# Patient Record
Sex: MALE | Race: WHITE | NOT HISPANIC OR LATINO | Employment: FULL TIME | URBAN - METROPOLITAN AREA
[De-identification: names, ages, dates, MRNs, and addresses within clinical notes are randomized per-mention and may not be internally consistent; named-entity substitution may affect disease eponyms.]

---

## 2023-12-12 DIAGNOSIS — Z12.11 COLON CANCER SCREENING: ICD-10-CM

## 2023-12-12 RX ORDER — POLYETHYLENE GLYCOL 3350, SODIUM SULFATE ANHYDROUS, SODIUM BICARBONATE, SODIUM CHLORIDE, POTASSIUM CHLORIDE 236; 22.74; 6.74; 5.86; 2.97 G/4L; G/4L; G/4L; G/4L; G/4L
4000 POWDER, FOR SOLUTION ORAL ONCE
Qty: 4000 ML | Refills: 0 | Status: SHIPPED | OUTPATIENT
Start: 2023-12-12 | End: 2023-12-12

## 2023-12-13 ENCOUNTER — OFFICE VISIT (OUTPATIENT)
Dept: ORTHOPEDIC SURGERY | Facility: HOSPITAL | Age: 58
End: 2023-12-13
Payer: COMMERCIAL

## 2023-12-13 ENCOUNTER — HOSPITAL ENCOUNTER (OUTPATIENT)
Dept: RADIOLOGY | Facility: HOSPITAL | Age: 58
Discharge: HOME | End: 2023-12-13
Payer: COMMERCIAL

## 2023-12-13 VITALS — BODY MASS INDEX: 27.16 KG/M2 | WEIGHT: 230 LBS | HEIGHT: 77 IN

## 2023-12-13 DIAGNOSIS — M25.819 SHOULDER IMPINGEMENT: ICD-10-CM

## 2023-12-13 PROCEDURE — 73030 X-RAY EXAM OF SHOULDER: CPT | Mod: RIGHT SIDE | Performed by: RADIOLOGY

## 2023-12-13 PROCEDURE — 2500000005 HC RX 250 GENERAL PHARMACY W/O HCPCS: Performed by: PHYSICIAN ASSISTANT

## 2023-12-13 PROCEDURE — 73030 X-RAY EXAM OF SHOULDER: CPT | Mod: RT

## 2023-12-13 PROCEDURE — 99213 OFFICE O/P EST LOW 20 MIN: CPT | Performed by: PHYSICIAN ASSISTANT

## 2023-12-13 PROCEDURE — 1036F TOBACCO NON-USER: CPT | Performed by: PHYSICIAN ASSISTANT

## 2023-12-13 PROCEDURE — 2500000004 HC RX 250 GENERAL PHARMACY W/ HCPCS (ALT 636 FOR OP/ED): Performed by: PHYSICIAN ASSISTANT

## 2023-12-13 ASSESSMENT — PAIN DESCRIPTION - DESCRIPTORS: DESCRIPTORS: THROBBING;ACHING

## 2023-12-13 ASSESSMENT — PAIN - FUNCTIONAL ASSESSMENT: PAIN_FUNCTIONAL_ASSESSMENT: 0-10

## 2023-12-13 ASSESSMENT — PAIN SCALES - GENERAL: PAINLEVEL_OUTOF10: 5 - MODERATE PAIN

## 2023-12-14 PROCEDURE — 20610 DRAIN/INJ JOINT/BURSA W/O US: CPT | Performed by: PHYSICIAN ASSISTANT

## 2023-12-14 PROCEDURE — 2500000004 HC RX 250 GENERAL PHARMACY W/ HCPCS (ALT 636 FOR OP/ED): Performed by: PHYSICIAN ASSISTANT

## 2023-12-14 PROCEDURE — 2500000005 HC RX 250 GENERAL PHARMACY W/O HCPCS: Performed by: PHYSICIAN ASSISTANT

## 2023-12-14 RX ORDER — TRIAMCINOLONE ACETONIDE 40 MG/ML
40 INJECTION, SUSPENSION INTRA-ARTICULAR; INTRAMUSCULAR
Status: COMPLETED | OUTPATIENT
Start: 2023-12-14 | End: 2023-12-14

## 2023-12-14 RX ORDER — LIDOCAINE HYDROCHLORIDE 10 MG/ML
4 INJECTION INFILTRATION; PERINEURAL
Status: COMPLETED | OUTPATIENT
Start: 2023-12-14 | End: 2023-12-14

## 2023-12-14 RX ADMIN — TRIAMCINOLONE ACETONIDE 40 MG: 400 INJECTION, SUSPENSION INTRA-ARTICULAR; INTRAMUSCULAR at 11:08

## 2023-12-14 RX ADMIN — LIDOCAINE HYDROCHLORIDE 4 ML: 10 INJECTION, SOLUTION INFILTRATION; PERINEURAL at 11:08

## 2023-12-14 NOTE — PROGRESS NOTES
Steven presents to clinic today with complaints of right shoulder pain.  He has been having this issues for approximately a year and a half.  He was seen by Dr. Newton at that time recommendation was for physical therapy and anti-inflammatory medication.  He did get some relief with therapy however recently started noticing worsening recurrent symptoms after a fall where he landed onto his right outstretched hand.  He has difficulty with certain activities such as reaching and laying on the affected side.  Denies any numbness or tingling.  Has been continuing with some home therapy exercises without any significant relief.    Patient's self reported past medical history, medications, allergies, surgical history, family and social history as well as a 10 point review of systems has been documented in the new patient intake form and scanned into the patient's electronic medical record. Pertinent findings are documented in the HPI.    Physical Examination Findings:  Constitutional: Appears well-developed and well-nourished.  Head: Normocephalic and atraumatic.  Eyes: Pupils are equal and round.  Cardiovascular: Intact distal pulses.   Respiratory: Effort normal. No respiratory distress.  Neurologic: Alert and oriented to person, place, and time.  Skin: Skin is warm and dry.  Hematologic / Lymphatic: No lymphedema, lymphangitis.  Psychiatric: normal mood and affect. Behavior is normal.   Musculoskeletal: Right upper extremity with full range of motion forward arm elevation 180 degrees, abduction 180 degrees.  External rotation 80 degrees.  Motor power abduction 5/5, external rotation 5/5, internal rotation 5/5.  Pain with resisted shoulder abduction.    Reviewed x-rays taken today of the right shoulder reveals well-preserved joint spaces no acute findings.    Impression: Right shoulder impingement    Plan: We discussed his symptoms in detail today.  His pain is consistent with shoulder impingement.  I discussed this  diagnosis in detail today we also discussed differential diagnosis of rotator cuff involvement.  At this time given he has failed conservative treatment we have discussed a subacromial steroid injection.  This was provided today and tolerated well.  If he continues to have symptoms or does not get relief following injection we have discussed further evaluation with MRI of the right shoulder.  He will return to office as needed.    Patient ID: Steven Brice is a 58 y.o. male.    L Inj/Asp: R subacromial bursa on 12/14/2023 11:08 AM  Details: 22 G needle, posterior approach  Medications: 40 mg triamcinolone acetonide 40 mg/mL; 4 mL lidocaine 10 mg/mL (1 %)  Procedure, treatment alternatives, risks and benefits explained, specific risks discussed. Immediately prior to procedure a time out was called to verify the correct patient, procedure, equipment, support staff and site/side marked as required.         Herminia Leblanc PA-C  Department of Orthopaedic Surgery  [facility]    Dictation performed with the use of voice recognition software. Syntax and grammatical errors may exist.

## 2024-01-24 ENCOUNTER — OFFICE VISIT (OUTPATIENT)
Dept: GASTROENTEROLOGY | Facility: EXTERNAL LOCATION | Age: 59
End: 2024-01-24
Payer: COMMERCIAL

## 2024-01-24 DIAGNOSIS — D12.2 BENIGN NEOPLASM OF ASCENDING COLON: Primary | ICD-10-CM

## 2024-01-24 DIAGNOSIS — K57.30 DIVERTICULOSIS OF LARGE INTESTINE WITHOUT DIVERTICULITIS: ICD-10-CM

## 2024-01-24 DIAGNOSIS — Z12.11 ENCOUNTER FOR SCREENING FOR MALIGNANT NEOPLASM OF COLON: ICD-10-CM

## 2024-01-24 DIAGNOSIS — K64.8 INTERNAL HEMORRHOIDS: ICD-10-CM

## 2024-01-24 DIAGNOSIS — D12.3 BENIGN NEOPLASM OF TRANSVERSE COLON: ICD-10-CM

## 2024-01-24 PROCEDURE — 45380 COLONOSCOPY AND BIOPSY: CPT | Performed by: INTERNAL MEDICINE

## 2024-01-24 PROCEDURE — 88305 TISSUE EXAM BY PATHOLOGIST: CPT

## 2024-01-24 PROCEDURE — 1036F TOBACCO NON-USER: CPT | Performed by: INTERNAL MEDICINE

## 2024-01-24 PROCEDURE — 88305 TISSUE EXAM BY PATHOLOGIST: CPT | Performed by: PATHOLOGY

## 2024-01-24 PROCEDURE — 45385 COLONOSCOPY W/LESION REMOVAL: CPT | Performed by: INTERNAL MEDICINE

## 2024-01-24 PROCEDURE — 0753T DGTZ GLS MCRSCP SLD LEVEL IV: CPT

## 2024-01-24 NOTE — PROGRESS NOTES
Endoscopic procedure was done in [place] and the procedure report can be found under the media tab.

## 2024-01-25 ENCOUNTER — LAB REQUISITION (OUTPATIENT)
Dept: LAB | Facility: HOSPITAL | Age: 59
End: 2024-01-25
Payer: COMMERCIAL

## 2024-01-30 LAB
LABORATORY COMMENT REPORT: NORMAL
PATH REPORT.FINAL DX SPEC: NORMAL
PATH REPORT.GROSS SPEC: NORMAL
PATH REPORT.MICROSCOPIC SPEC OTHER STN: NORMAL
PATH REPORT.RELEVANT HX SPEC: NORMAL
PATH REPORT.TOTAL CANCER: NORMAL

## 2024-04-17 ENCOUNTER — OFFICE VISIT (OUTPATIENT)
Dept: ORTHOPEDIC SURGERY | Facility: CLINIC | Age: 59
End: 2024-04-17
Payer: COMMERCIAL

## 2024-04-17 VITALS — WEIGHT: 230 LBS | HEIGHT: 77 IN | BODY MASS INDEX: 27.16 KG/M2

## 2024-04-17 DIAGNOSIS — M75.81 ROTATOR CUFF TENDINITIS, RIGHT: ICD-10-CM

## 2024-04-17 DIAGNOSIS — M75.21 BICEPS TENDINITIS ON RIGHT: ICD-10-CM

## 2024-04-17 DIAGNOSIS — S46.911A RIGHT SHOULDER STRAIN, INITIAL ENCOUNTER: Primary | ICD-10-CM

## 2024-04-17 PROCEDURE — 99214 OFFICE O/P EST MOD 30 MIN: CPT

## 2024-04-17 PROCEDURE — 1036F TOBACCO NON-USER: CPT

## 2024-04-17 RX ORDER — METHYLPREDNISOLONE 4 MG/1
4 TABLET ORAL ONCE
Qty: 1 TABLET | Refills: 0 | Status: SHIPPED | OUTPATIENT
Start: 2024-04-17 | End: 2024-04-17

## 2024-04-17 ASSESSMENT — PAIN SCALES - GENERAL: PAINLEVEL_OUTOF10: 5 - MODERATE PAIN

## 2024-04-17 ASSESSMENT — PAIN DESCRIPTION - DESCRIPTORS: DESCRIPTORS: ACHING

## 2024-04-17 ASSESSMENT — PAIN - FUNCTIONAL ASSESSMENT: PAIN_FUNCTIONAL_ASSESSMENT: 0-10

## 2024-04-17 NOTE — PROGRESS NOTES
Steven Brice is a 59 y.o. year-old  male  he is a new patient to our office and presents with a chief complaint of Right shoulder pain. This has been an ongoing issues for him over the past 2 years, but most recently 2 months ago he was playing basketball and his shoulder was jammed by another player. The pain has increased since then. He has been seen by other ortho providers for this shoulder who had been treating him with conservative measures such as NSAIDs, physical therapy and steroid injection (given on 12/13/23). The pain has continued despite these treatment modalities. Pains location is in the front of the shoulder. Admits to the injury, pain with raising arm over head, pain with laying on shoulder, night pain, weakness of the RUE. Denies neck pain, numbness/tingling, prior surgery to the shoulder. Treatment to date includes activity modification/NSAIDs/home exercises/physical therapy without adequate relief.       Past Medical, Family, and Social History reviewed     Review of Systems  A complete review of systems was conducted, pertinent only to the HPI noted above.  Constitutional: None  Eyes: No additions to above history  Ears, Nose, Throat: No additions to above history  Cardiovascular: No additions to above history  Respiratory: No additions to above history  GI: No additions to above history  : No additions to above history  Skin/Neuro: No additions to above history  Endocrine/Heme/Lymph: No additions to above history  Immunologic: No additions to above history  Psychiatric: No additions to above history  Musculoskeletal: see above  Physical Exam  GEN: Alert and Oriented x 3  Constitutional: Well appearing, in no apparent distress.  Eyes: sclera anicteric  ENT: hearing appropriate for normal conversation, neck appears symmetric with no gross thyromegaly  Pulm: No labored breathing, no wheezing  CVS: Regular rate and rhythm  PSY: normal mood and affect  Skin: No rashes, erythema, or induration  around shoulder     Focused Musculoskeletal Exam:     Side: Right shoulder:  PROM:   FE (170)   ER 60 ABER/ABIR: 90/90  AROM:   FE (170)   ER 45 IR T8  Strength:  Supra [4/5] Infra [5/5] Subscap [5/5]  Abd [5/5]    Special Tests  Shoulder  Impingement Tests:  Neer + Heredia +  Speed's test +      ACJ:  AC TTP: [neg]  Cross Arm [neg]  AC prominence [no]      [Sensation intact Ax/median/ulnar/radial distributions  Motor intact Ax/median/radial/ulnar/AIN/PIN    X-rays of the shoulder independently viewed and interpreted: humeral head well aligned within glenoid. joint spaced well preserved. No fracture or dislocation.     1. Right shoulder strain, initial encounter  MR shoulder right wo IV contrast    methylPREDNISolone (Medrol Dospak) 4 mg tablets      2. Rotator cuff tendinitis, right        3. Biceps tendinitis on right             The patient history, physical examination and imaging studies are consistent with the diagnosis above.    Given the patient's failure of multiple conservative treatments, symptoms, and clinical exam findings, recommend an MRI of the right shoulder to evaluate for the rotator cuff and biceps tendon. He elected to proceed. He has been taking NSAIDs chronically, recommend he discontinue and offered a tapered medrol dose pack in the meantime which was prescribed. We will see the patient back after the MRI to review the results and further recommendations will be made at that time. All questions answered, patient in agreement with plan.         Active Ambulatory Problems     Diagnosis Date Noted    No Active Ambulatory Problems     Resolved Ambulatory Problems     Diagnosis Date Noted    No Resolved Ambulatory Problems     Past Medical History:   Diagnosis Date    Fracture of nasal bones, initial encounter for closed fracture     Personal history of (healed) traumatic fracture     Personal history of other diseases of the digestive system     Unspecified injury of unspecified ankle, initial  encounter         Social History     Socioeconomic History    Marital status:      Spouse name: None    Number of children: None    Years of education: None    Highest education level: None   Occupational History    None   Tobacco Use    Smoking status: Never    Smokeless tobacco: Never   Vaping Use    Vaping status: Never Used   Substance and Sexual Activity    Alcohol use: Yes    Drug use: Never    Sexual activity: None   Other Topics Concern    None   Social History Narrative    None     Social Determinants of Health     Financial Resource Strain: Not on file   Food Insecurity: Not on file   Transportation Needs: Not on file   Physical Activity: Not on file   Stress: Not on file   Social Connections: Not on file   Intimate Partner Violence: Not on file   Housing Stability: Not on file        No family history on file.     Past Surgical History:   Procedure Laterality Date    HEMORRHOID SURGERY  06/27/2014    Hemorrhoidectomy        No Known Allergies

## 2024-04-30 ENCOUNTER — APPOINTMENT (OUTPATIENT)
Dept: RADIOLOGY | Facility: HOSPITAL | Age: 59
End: 2024-04-30
Payer: COMMERCIAL

## 2024-04-30 ENCOUNTER — HOSPITAL ENCOUNTER (OUTPATIENT)
Dept: RADIOLOGY | Facility: HOSPITAL | Age: 59
Discharge: HOME | End: 2024-04-30
Payer: COMMERCIAL

## 2024-04-30 DIAGNOSIS — S46.911A RIGHT SHOULDER STRAIN, INITIAL ENCOUNTER: ICD-10-CM

## 2024-04-30 PROCEDURE — 73221 MRI JOINT UPR EXTREM W/O DYE: CPT | Mod: RT

## 2024-04-30 PROCEDURE — 73221 MRI JOINT UPR EXTREM W/O DYE: CPT | Mod: RIGHT SIDE | Performed by: RADIOLOGY

## 2024-05-07 ENCOUNTER — OFFICE VISIT (OUTPATIENT)
Dept: ORTHOPEDIC SURGERY | Facility: CLINIC | Age: 59
End: 2024-05-07
Payer: COMMERCIAL

## 2024-05-07 DIAGNOSIS — S46.011D TRAUMATIC COMPLETE TEAR OF RIGHT ROTATOR CUFF, SUBSEQUENT ENCOUNTER: Primary | ICD-10-CM

## 2024-05-07 PROCEDURE — 99214 OFFICE O/P EST MOD 30 MIN: CPT | Performed by: STUDENT IN AN ORGANIZED HEALTH CARE EDUCATION/TRAINING PROGRAM

## 2024-05-07 PROCEDURE — 20610 DRAIN/INJ JOINT/BURSA W/O US: CPT | Performed by: STUDENT IN AN ORGANIZED HEALTH CARE EDUCATION/TRAINING PROGRAM

## 2024-05-07 PROCEDURE — 2500000005 HC RX 250 GENERAL PHARMACY W/O HCPCS: Performed by: STUDENT IN AN ORGANIZED HEALTH CARE EDUCATION/TRAINING PROGRAM

## 2024-05-07 PROCEDURE — 2500000004 HC RX 250 GENERAL PHARMACY W/ HCPCS (ALT 636 FOR OP/ED): Performed by: STUDENT IN AN ORGANIZED HEALTH CARE EDUCATION/TRAINING PROGRAM

## 2024-05-07 RX ORDER — TRIAMCINOLONE ACETONIDE 40 MG/ML
40 INJECTION, SUSPENSION INTRA-ARTICULAR; INTRAMUSCULAR
Status: COMPLETED | OUTPATIENT
Start: 2024-05-07 | End: 2024-05-07

## 2024-05-07 RX ORDER — LIDOCAINE HYDROCHLORIDE 10 MG/ML
4 INJECTION INFILTRATION; PERINEURAL
Status: COMPLETED | OUTPATIENT
Start: 2024-05-07 | End: 2024-05-07

## 2024-05-07 RX ADMIN — LIDOCAINE HYDROCHLORIDE 4 ML: 10 INJECTION, SOLUTION INFILTRATION; PERINEURAL at 14:13

## 2024-05-07 RX ADMIN — TRIAMCINOLONE ACETONIDE 40 MG: 40 INJECTION, SUSPENSION INTRA-ARTICULAR; INTRAMUSCULAR at 14:13

## 2024-05-07 NOTE — PROGRESS NOTES
Steven Brice is a 59 y.o. year-old  male  he is a new patient to our office and presents with a chief complaint of Right shoulder pain.  He notes this pain is been ongoing for a year he has had 1 corticosteroid injection without significant relief      Past Medical, Family, and Social History reviewed     Review of Systems  A complete review of systems was conducted, pertinent only to the HPI noted above.    Physical Exam  GEN: Alert and Oriented x 3  Constitutional: Well appearing, in no apparent distress.  Eyes: sclera anicteric  ENT: hearing appropriate for normal conversation, neck appears symmetric with no gross thyromegaly  Pulm: No labored breathing, no wheezing  CVS: Regular rate and rhythm  PSY: normal mood and affect  Skin: No rashes, erythema, or induration around shoulder     Focused Musculoskeletal Exam:     Side: Right shoulder:  PROM:   FE (170)   ER 60 ABER/ABIR: 90/90  AROM:   FE (170)   ER 45 IR T8  Strength:  Supra [4/5] Infra [5/5] Subscap [5/5]  Abd [5/5]    Special Tests  Shoulder  Pain with Jobes and empty can positive tenderness over the biceps      ACJ:  AC TTP: [neg]  Cross Arm [neg]  AC prominence [no]      [Sensation intact Ax/median/ulnar/radial distributions  Motor intact Ax/median/radial/ulnar/AIN/PIN    X-rays and MRI of the shoulder independently viewed and interpreted: Full-thickness anterior supraspinatus tendon rupture with minimal retraction no significant atrophy upper border subscap tear with biceps subluxation    L Inj/Asp: R glenohumeral on 5/7/2024 2:13 PM  Indications: pain  Details: 21 G needle, posterior approach  Medications: 40 mg triamcinolone acetonide 40 mg/mL; 4 mL lidocaine 10 mg/mL (1 %)  Outcome: tolerated well, no immediate complications  Procedure, treatment alternatives, risks and benefits explained, specific risks discussed. Consent was given by the patient. Immediately prior to procedure a time out was called to verify the correct patient, procedure,  equipment, support staff and site/side marked as required. Patient was prepped and draped in the usual sterile fashion.         The patient history, physical examination and imaging studies are consistent with the diagnosis above.    I did review the MRI with the patient which does demonstrate a rotator cuff tendon tear biceps subluxation due to upper border subscap tear.  He is exhausted nonsurgical treatment and I discussed and recommended surgery in the form of arthroscopy rotator cuff repair and biceps tenodesis.  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  He is interested in another injection.  I did review with him the risk of repeated injections prior to rotator cuff repair with regards to tendon healing.  Nevertheless he elected to proceed.  He is going to think about when he may want to proceed with surgery I did briefly review the risk of surgery including but not limited bleed infection injury to any tissues nerves vessels DVT/PE frozen shoulder tendon  nonhealing and the potential need for future surgery.  Should he elect to proceed with rotator cuff repair he can schedule through my office.

## 2024-05-21 ENCOUNTER — OFFICE VISIT (OUTPATIENT)
Dept: UROLOGY | Facility: HOSPITAL | Age: 59
End: 2024-05-21
Payer: COMMERCIAL

## 2024-05-21 DIAGNOSIS — N13.8 BPH WITH OBSTRUCTION/LOWER URINARY TRACT SYMPTOMS: Primary | ICD-10-CM

## 2024-05-21 DIAGNOSIS — N40.1 BPH WITH OBSTRUCTION/LOWER URINARY TRACT SYMPTOMS: Primary | ICD-10-CM

## 2024-05-21 PROCEDURE — 99214 OFFICE O/P EST MOD 30 MIN: CPT | Performed by: STUDENT IN AN ORGANIZED HEALTH CARE EDUCATION/TRAINING PROGRAM

## 2024-05-21 PROCEDURE — 99204 OFFICE O/P NEW MOD 45 MIN: CPT | Performed by: STUDENT IN AN ORGANIZED HEALTH CARE EDUCATION/TRAINING PROGRAM

## 2024-05-21 RX ORDER — TAMSULOSIN HYDROCHLORIDE 0.4 MG/1
0.4 CAPSULE ORAL DAILY
Qty: 30 CAPSULE | Refills: 11 | Status: SHIPPED | OUTPATIENT
Start: 2024-05-21 | End: 2025-05-21

## 2024-05-21 NOTE — PROGRESS NOTES
Subjective   Patient ID: Steven Brice is a 59 y.o. male    HPI  59 y.o. male who reports experiencing urinary symptoms over the past few months. He describes issues with frequency, urgency, weak stream, and incomplete emptying of the bladder. He wakes up three to four times at night to urinate. The patient has not been on any medication for these symptoms. He has also mentioned problems with swallowing and typically drinks sips of water during dinner. The patient is an international  and has recently returned from a trip out of the country.       Review of Systems    All systems were reviewed. Anything negative was noted in the HPI.    Objective   Physical Exam    General: Well developed, well nourished, alert and cooperative, appears in no acute distress   Eyes: Non-injected conjunctiva, sclera clear, no proptosis   Cardiac: Extremities are warm and well perfused. No edema, cyanosis or pallor   Lungs: Breathing is easy, non-labored. Speaking in clear and complete sentences. Normal diaphragmatic movement   MSK: Ambulatory with steady gait, unassisted   Neuro: Alert and oriented to person, place, and time   Psych: Demonstrates good judgment and reason, without hallucinations, abnormal affect or abnormal behaviors   Skin: No obvious lesions, no rashes       No CVA tenderness bilaterally   No suprapubic pain or discomfort       Past Medical History:   Diagnosis Date   • Fracture of nasal bones, initial encounter for closed fracture     Fractured nose   • Personal history of (healed) traumatic fracture     History of fracture of finger   • Personal history of other diseases of the digestive system     History of hemorrhoids   • Unspecified injury of unspecified ankle, initial encounter     Ankle injury         Past Surgical History:   Procedure Laterality Date   • HEMORRHOID SURGERY  06/27/2014    Hemorrhoidectomy           Assessment/Plan   Benign Prostatic Hyperplasia (BPH)    59 y.o. male who presents for  the above condition, Today, we had a very long and extensive discussion with the patient regarding the pathophysiology, differential diagnosis, risk factor, associated condition, diagnostic work-up and management of BPH and lower urinary tract symptoms and acute urinary retention. I discussed with the patient the need to check his PSA to assess his prostate cancer risk. We discussed at length the mechanism of action, risk, benefit, adverse events and side effect of alpha-blocker in the form of tamsulosin 0.4 mg p.o nightly. We discussed in particular the risk of hypotension, lightheadedness, dizziness, and the risk of fall and bone fracture. Also discussed retrograde ejaculation of the side effects of the medication. We had another discussion with the patient regarding lifestyle modifications including low fluid intake after 5 PM, timed voiding every 2 hours, and decrease caffeine intake. I discussed with the patient that in case he had an elevated PSA, we will proceed do an MRI of the prostate.      Plan:  - Renal US  - Flomax 0.4 mg/day  - Follow up in 1 month with cystoscopy        5/21/2024    Shirin Attestation  By signing my name below, I, Shirin Hunt   attest that this documentation has been prepared under the direction and in the presence of Dr. Omar Vaughan

## 2024-06-11 ENCOUNTER — HOSPITAL ENCOUNTER (OUTPATIENT)
Dept: RADIOLOGY | Facility: HOSPITAL | Age: 59
Discharge: HOME | End: 2024-06-11
Payer: COMMERCIAL

## 2024-06-11 ENCOUNTER — APPOINTMENT (OUTPATIENT)
Dept: RADIOLOGY | Facility: CLINIC | Age: 59
End: 2024-06-11
Payer: COMMERCIAL

## 2024-06-11 ENCOUNTER — APPOINTMENT (OUTPATIENT)
Dept: RADIOLOGY | Facility: HOSPITAL | Age: 59
End: 2024-06-11
Payer: COMMERCIAL

## 2024-06-11 ENCOUNTER — PROCEDURE VISIT (OUTPATIENT)
Dept: UROLOGY | Facility: HOSPITAL | Age: 59
End: 2024-06-11
Payer: COMMERCIAL

## 2024-06-11 DIAGNOSIS — N40.1 BPH WITH OBSTRUCTION/LOWER URINARY TRACT SYMPTOMS: ICD-10-CM

## 2024-06-11 DIAGNOSIS — N40.0 BENIGN PROSTATIC HYPERPLASIA, UNSPECIFIED WHETHER LOWER URINARY TRACT SYMPTOMS PRESENT: ICD-10-CM

## 2024-06-11 DIAGNOSIS — N13.8 BPH WITH OBSTRUCTION/LOWER URINARY TRACT SYMPTOMS: ICD-10-CM

## 2024-06-11 DIAGNOSIS — Z79.2 PROPHYLACTIC ANTIBIOTIC: Primary | ICD-10-CM

## 2024-06-11 LAB
POC APPEARANCE, URINE: CLEAR
POC BILIRUBIN, URINE: NEGATIVE
POC BLOOD, URINE: NEGATIVE
POC COLOR, URINE: YELLOW
POC GLUCOSE, URINE: NEGATIVE MG/DL
POC KETONES, URINE: NEGATIVE MG/DL
POC LEUKOCYTES, URINE: NEGATIVE
POC NITRITE,URINE: NEGATIVE
POC PH, URINE: 6.5 PH
POC PROTEIN, URINE: NEGATIVE MG/DL
POC SPECIFIC GRAVITY, URINE: 1.02
POC UROBILINOGEN, URINE: 0.2 EU/DL

## 2024-06-11 PROCEDURE — 81003 URINALYSIS AUTO W/O SCOPE: CPT | Mod: QW | Performed by: STUDENT IN AN ORGANIZED HEALTH CARE EDUCATION/TRAINING PROGRAM

## 2024-06-11 PROCEDURE — 76770 US EXAM ABDO BACK WALL COMP: CPT | Performed by: RADIOLOGY

## 2024-06-11 PROCEDURE — 76770 US EXAM ABDO BACK WALL COMP: CPT

## 2024-06-11 PROCEDURE — 52000 CYSTOURETHROSCOPY: CPT | Performed by: STUDENT IN AN ORGANIZED HEALTH CARE EDUCATION/TRAINING PROGRAM

## 2024-06-11 PROCEDURE — 99213 OFFICE O/P EST LOW 20 MIN: CPT | Performed by: STUDENT IN AN ORGANIZED HEALTH CARE EDUCATION/TRAINING PROGRAM

## 2024-06-11 RX ORDER — CIPROFLOXACIN 500 MG/1
500 TABLET ORAL ONCE
Status: SHIPPED | OUTPATIENT
Start: 2024-06-11

## 2024-06-11 NOTE — PROGRESS NOTES
Patient ID: Steven Brice is a 59 y.o. male.    Cystoscopy    Date/Time: 6/11/2024 3:53 PM    Performed by: Omar Vaughan MD MPH  Authorized by: Omar Vaughan MD MPH    Procedure - Bladder Cystoscopy:     Procedure details: cystoscopy    PROCEDURE NOTE:    PREOPERATIVE DIAGNOSIS:  BPH    POSTOPERATIVE DIAGNOSIS:  Same    OPERATION:  Flexible Cystourethroscopy      SURGEON:  Omar Vaughan MD MPH    ANESTHESIA:  2%  lidocaine jelly    COMPLICATIONS:  None    EBL: Minimal        DISPOSITION:  The patient was discharged home after the procedure, per routine.    INDICATIONS: :  Mr. Brice is a 59 y.o. patient with a history of BPH who presents today for Cystoscopy.     The indications, risks and benefits of this procedure were discussed with the patient, consent was obtained prior to the procedure, and to the best of my judgement the patient seemed to understand and agree to the procedure.    PROCEDURE:  The patient  was brought into the procedure suite and informed consent was reviewed and confirmed. Vital signs were obtained prior to the procedure: There were no vitals taken for this visit..  The patient was escorted onto the stretcher, placed supine, prepped with betadine and draped in the usual standard surgical fashion.  Intraurethral 2% viscous lidocaine jelly was used for local analgesia.  A 16 Polish flexible cystourethroscope was inserted into the urethra.   The penile urethra was normal.  The prostate urethra was enlarged.  Upon entering the bladder the entire bladder was surveyed in a 360 degree fashion.  The left and right ureteral orifices were in normal orthotopic position effluxing clear yellow urine, bilaterally.   There was no evidence of any bladder lesions, foreign objects, stones or evidence of any mucosal changes. The cystoscope was then retroflexed.  The bladder neck was then further examined without any evidence of lesions. The scope was then removed and in an antegrade fashion, the  urethra and bladder were again resurveyed with no evidence of additional lesions.  The cystoscope was then fully removed.   The patient tolerated the procedure well.  Vitals were stable after the procedure.  The patient was able to void and was discharged home.  Verbal and written Post procedure instructions were reviewed with the patient.    The scope malfunctioned mid procedure and we had to change it and use a new flexible cystoscope. No complications or side effect resulted from this event.      IMPRESSION:  Moderate BPH    PLAN:  Fu in 1 month

## 2024-06-11 NOTE — PROGRESS NOTES
Subjective   Patient ID: Steven Brice is a 59 y.o. male    HPI  59 y.o. male who for cystoscopic evaluation of prostate, he was experiencing urinary symptoms over the past few months. He describes issues with frequency, urgency, weak stream, and incomplete emptying of the bladder. He wakes up three to four times at night to urinate. The patient has not been on any medication for these symptoms. He has also mentioned problems with swallowing and typically drinks sips of water during dinner. The patient is an international  and has recently returned from a trip out of the country.        Review of Systems    All systems were reviewed. Anything negative was noted in the HPI.    Objective   Physical Exam    General: Well developed, well nourished, alert and cooperative, appears in no acute distress   Eyes: Non-injected conjunctiva, sclera clear, no proptosis   Cardiac: Extremities are warm and well perfused. No edema, cyanosis or pallor   Lungs: Breathing is easy, non-labored. Speaking in clear and complete sentences. Normal diaphragmatic movement   MSK: Ambulatory with steady gait, unassisted   Neuro: Alert and oriented to person, place, and time   Psych: Demonstrates good judgment and reason, without hallucinations, abnormal affect or abnormal behaviors   Skin: No obvious lesions, no rashes       No CVA tenderness bilaterally   No suprapubic pain or discomfort       Past Medical History:   Diagnosis Date    Fracture of nasal bones, initial encounter for closed fracture     Fractured nose    Personal history of (healed) traumatic fracture     History of fracture of finger    Personal history of other diseases of the digestive system     History of hemorrhoids    Unspecified injury of unspecified ankle, initial encounter     Ankle injury         Past Surgical History:   Procedure Laterality Date    HEMORRHOID SURGERY  06/27/2014    Hemorrhoidectomy           Assessment/Plan   Cystoscopic evaluation of Benign  Prostatic Hyperplasia (BPH)    59 y.o. male who presents for the above condition, Today, we had a very long and extensive discussion with the patient regarding the pathophysiology, differential diagnosis, risk factor, associated condition, diagnostic work-up and management of BPH and lower urinary tract symptoms and acute urinary retention. I discussed with the patient the need to check his PSA to assess his prostate cancer risk. We discussed at length the mechanism of action, risk, benefit, adverse events and side effect of alpha-blocker in the form of tamsulosin 0.4 mg p.o nightly. We discussed in particular the risk of hypotension, lightheadedness, dizziness, and the risk of fall and bone fracture. Also discussed retrograde ejaculation of the side effects of the medication. We had another discussion with the patient regarding lifestyle modifications including low fluid intake after 5 PM, timed voiding every 2 hours, and decrease caffeine intake. I discussed with the patient that in case he had an elevated PSA, we will proceed do an MRI of the prostate.      Plan:  - Fu in 1 month to discuss Urolift   - PSA        6/11/2024    Shirin Attestation  By signing my name below, IChad Scribe   attest that this documentation has been prepared under the direction and in the presence of Dr. Omar Vaughan

## 2024-06-13 ENCOUNTER — TELEPHONE (OUTPATIENT)
Dept: UROLOGY | Facility: HOSPITAL | Age: 59
End: 2024-06-13
Payer: COMMERCIAL

## 2024-06-13 DIAGNOSIS — N40.1 BPH WITH OBSTRUCTION/LOWER URINARY TRACT SYMPTOMS: Primary | ICD-10-CM

## 2024-06-13 DIAGNOSIS — N13.8 BPH WITH OBSTRUCTION/LOWER URINARY TRACT SYMPTOMS: Primary | ICD-10-CM

## 2024-06-13 NOTE — TELEPHONE ENCOUNTER
----- Message from Omar Vaughan MD MPH sent at 6/13/2024  8:33 AM EDT -----  He needs plz a CT a/p with no contrast   ----- Message -----  From: Erendira, Radiology Results In  Sent: 6/13/2024  12:23 AM EDT  To: Omar Vaughan MD MPH

## 2024-06-25 ENCOUNTER — APPOINTMENT (OUTPATIENT)
Dept: UROLOGY | Facility: HOSPITAL | Age: 59
End: 2024-06-25
Payer: COMMERCIAL

## 2024-06-28 ENCOUNTER — HOSPITAL ENCOUNTER (OUTPATIENT)
Dept: RADIOLOGY | Facility: CLINIC | Age: 59
Discharge: HOME | End: 2024-06-28
Payer: COMMERCIAL

## 2024-06-28 DIAGNOSIS — N40.1 BPH WITH OBSTRUCTION/LOWER URINARY TRACT SYMPTOMS: ICD-10-CM

## 2024-06-28 DIAGNOSIS — N13.8 BPH WITH OBSTRUCTION/LOWER URINARY TRACT SYMPTOMS: ICD-10-CM

## 2024-06-28 PROCEDURE — 74176 CT ABD & PELVIS W/O CONTRAST: CPT

## 2024-07-11 ENCOUNTER — APPOINTMENT (OUTPATIENT)
Dept: UROLOGY | Facility: HOSPITAL | Age: 59
End: 2024-07-11
Payer: COMMERCIAL

## 2024-07-11 DIAGNOSIS — R97.20 PSA ELEVATION: Primary | ICD-10-CM

## 2024-07-11 PROCEDURE — 99214 OFFICE O/P EST MOD 30 MIN: CPT | Performed by: STUDENT IN AN ORGANIZED HEALTH CARE EDUCATION/TRAINING PROGRAM

## 2024-07-11 NOTE — PROGRESS NOTES
Subjective   Patient ID: Steven Brice is a 59 y.o. male    HPI  59 y.o. male who presents with concerns about a focal calcification in the kidney, which could be a stone or calcium in the calyx. The patient is worried about the potential for the stone to move and cause severe pain, especially given their frequent travel for work. Upon review, the calcification appears to be a calcified cyst embedded in the kidney, not communicating with the renal pelvis, and thus unlikely to cause problems. The patient also has a history of elevated PSA, which raises concerns about prostate cancer. The patient has undergone a cystoscopy to assess prostate enlargement, which is separate from prostate cancer. The patient is also concerned about a possible hernia, colonic diverticulosis, an enlarged spleen, and lymphadenopathy. Follow-up with primary care and gastroenterology is recommended for these  issues.    The most recent CT abdomen pelvis wo IV contrast, conducted on 6/28/2024, revealed:  1.  Approximate 1.1 cm focal calcification or stone midpole left  renal cortex correlates with finding from recent ultrasound. No  hydronephrosis or ureteral stones.  2. Enlarged prostate gland. Correlate with serum PSA. Nonspecific  urinary bladder wall thickening.  3. Stomach distended with ingested contents. The possibility of  partial or developing gastric outlet obstruction cannot be entirely  excluded. There are also mildly distended fluid filled and fecalized  small bowel loops scattered throughout could reflect nonspecific  ileus/enteritis. Clinical correlation and follow-up advised.  4. Prominent colonic stool throughout. Mild diverticulosis with some  associated wall thickening versus incomplete distention at the distal  colon. No definite regional inflammatory change. Correlation with  colonoscopy may be considered as a means of further assessment.  5. Multiple vague hypoattenuating foci throughout the spleen  incompletely  characterized. Follow-up warranted and consider MRI for  further assessment.  6. Mildly prominent nonspecific abdominopelvic nodes as above.  7. Multiple densities just beneath the right anterolateral abdominal  wall inferiorly probably relating to sequela of previous hernia  repair. Small right and moderate left fat containing inguinal  hernias. Small fat containing umbilical hernia.  8. Multilevel degenerative changes visualized spine. Irregular  hypoattenuating foci at the inferior endplates L2 through L4 as  described probably representing Schmorl's nodes although other  etiologies are not entirely excluded. Mild anterolisthesis L5 on S1  and retrolisthesis L2 on L3 and L3 on L4. At least moderate central  canal stenosis L4-L5 and L5-S1. Correlation with MRI may be  considered as a means of further assessment.  9. Additional findings as above.        Review of Systems    All systems were reviewed. Anything negative was noted in the HPI.    Objective   Physical Exam    General: Well developed, well nourished, alert and cooperative, appears in no acute distress   Eyes: Non-injected conjunctiva, sclera clear, no proptosis   Cardiac: Extremities are warm and well perfused. No edema, cyanosis or pallor   Lungs: Breathing is easy, non-labored. Speaking in clear and complete sentences. Normal diaphragmatic movement   MSK: Ambulatory with steady gait, unassisted   Neuro: Alert and oriented to person, place, and time   Psych: Demonstrates good judgment and reason, without hallucinations, abnormal affect or abnormal behaviors   Skin: No obvious lesions, no rashes       No CVA tenderness bilaterally   No suprapubic pain or discomfort       Past Medical History:   Diagnosis Date    Fracture of nasal bones, initial encounter for closed fracture     Fractured nose    Personal history of (healed) traumatic fracture     History of fracture of finger    Personal history of other diseases of the digestive system     History of  hemorrhoids    Unspecified injury of unspecified ankle, initial encounter     Ankle injury         Past Surgical History:   Procedure Laterality Date    HEMORRHOID SURGERY  06/27/2014    Hemorrhoidectomy           Assessment/Plan   Elevated PSA    59 y.o. male who presents for the above condition,  We had a very long and extensive discussion with the patient regarding elevated PSA. I explained to him the pathophysiology, differential diagnosis, risk factor, associated conditions, and management. I explained to him that elevated PSA could be either due to BPH, prostate infection or inflammation or prostate adenocarcinoma. We discussed the need to do a work-up to rule out any underlying prostate adenocarcinoma in the form of MR fusion biopsy if his MRI is positive or regular TRUS biopsy of the MRI is negative or we cannot do an MRI of the prostate. We discussed at length the risk, benefit, potential complication, adverse events of prostate biopsy including pain, discomfort, urinary retention, hematuria, pneumaturia, hematospermia. Explained to him that there is a 2% to 5% risk of complicated urinary infection and urosepsis. Explained to him indicates it happens, he will need to be admitted for IV antibiotic for 2 to 3 days at the hospital.       Plan:  - MR prostate  - Follow up in 1 month        7/11/2024    Scribe Attestation  By signing my name below, I, Shirin Hunt   attest that this documentation has been prepared under the direction and in the presence of Dr. Omar Vaughan

## 2024-07-18 ENCOUNTER — APPOINTMENT (OUTPATIENT)
Dept: PRIMARY CARE | Facility: CLINIC | Age: 59
End: 2024-07-18
Payer: COMMERCIAL

## 2024-07-18 VITALS
BODY MASS INDEX: 26.68 KG/M2 | HEART RATE: 68 BPM | WEIGHT: 226 LBS | HEIGHT: 77 IN | DIASTOLIC BLOOD PRESSURE: 70 MMHG | SYSTOLIC BLOOD PRESSURE: 120 MMHG | RESPIRATION RATE: 16 BRPM | TEMPERATURE: 97.9 F

## 2024-07-18 DIAGNOSIS — K59.09 CHRONIC CONSTIPATION: ICD-10-CM

## 2024-07-18 DIAGNOSIS — K40.90 UNILATERAL INGUINAL HERNIA WITHOUT OBSTRUCTION OR GANGRENE, RECURRENCE NOT SPECIFIED: Primary | ICD-10-CM

## 2024-07-18 DIAGNOSIS — M75.111 INCOMPLETE TEAR OF RIGHT ROTATOR CUFF, UNSPECIFIED WHETHER TRAUMATIC: ICD-10-CM

## 2024-07-18 PROBLEM — J32.9 CHRONIC SINUSITIS, UNSPECIFIED: Status: ACTIVE | Noted: 2024-07-18

## 2024-07-18 PROBLEM — H90.3 BILATERAL SENSORINEURAL HEARING LOSS: Status: ACTIVE | Noted: 2024-07-18

## 2024-07-18 PROBLEM — M75.81 TENDINITIS OF RIGHT ROTATOR CUFF: Status: ACTIVE | Noted: 2024-07-18

## 2024-07-18 PROBLEM — H81.399 PERIPHERAL VERTIGO: Status: ACTIVE | Noted: 2024-07-18

## 2024-07-18 PROBLEM — K21.9 ESOPHAGEAL REFLUX: Status: ACTIVE | Noted: 2024-07-18

## 2024-07-18 PROBLEM — A07.8 BLASTOCYSTIS HOMINIS INFECTION: Status: ACTIVE | Noted: 2024-07-18

## 2024-07-18 PROBLEM — M75.40 IMPINGEMENT SYNDROME OF SHOULDER REGION: Status: ACTIVE | Noted: 2024-07-18

## 2024-07-18 PROBLEM — U07.1 COVID-19: Status: ACTIVE | Noted: 2024-06-02

## 2024-07-18 PROBLEM — R13.19 ESOPHAGEAL DYSPHAGIA: Status: ACTIVE | Noted: 2024-07-18

## 2024-07-18 PROBLEM — E29.1 HYPOGONADISM MALE: Status: ACTIVE | Noted: 2024-07-18

## 2024-07-18 PROBLEM — R19.7 DIARRHEA: Status: ACTIVE | Noted: 2024-07-18

## 2024-07-18 PROBLEM — G89.29 CHRONIC NECK PAIN: Status: ACTIVE | Noted: 2024-07-18

## 2024-07-18 PROBLEM — R04.0 EPISTAXIS: Status: ACTIVE | Noted: 2024-07-18

## 2024-07-18 PROBLEM — M77.11 LATERAL EPICONDYLITIS OF RIGHT ELBOW: Status: ACTIVE | Noted: 2024-07-18

## 2024-07-18 PROBLEM — R10.9 ABDOMINAL PAIN: Status: ACTIVE | Noted: 2024-07-18

## 2024-07-18 PROBLEM — S02.2XXA FRACTURE OF NASAL BONE: Status: ACTIVE | Noted: 2024-07-18

## 2024-07-18 PROBLEM — R05.9 COUGH: Status: ACTIVE | Noted: 2024-07-18

## 2024-07-18 PROBLEM — M75.20 BICEPS TENDINITIS: Status: ACTIVE | Noted: 2024-07-18

## 2024-07-18 PROBLEM — H69.92 DYSFUNCTION OF LEFT EUSTACHIAN TUBE: Status: ACTIVE | Noted: 2024-07-18

## 2024-07-18 PROBLEM — R76.8 ELEVATED RHEUMATOID FACTOR: Status: ACTIVE | Noted: 2024-07-18

## 2024-07-18 PROBLEM — S46.919A STRAIN OF SHOULDER: Status: ACTIVE | Noted: 2024-07-18

## 2024-07-18 PROBLEM — S01.01XA LACERATION OF SCALP: Status: ACTIVE | Noted: 2024-07-18

## 2024-07-18 PROBLEM — M79.671 RIGHT FOOT PAIN: Status: ACTIVE | Noted: 2024-07-18

## 2024-07-18 PROBLEM — A09 TRAVELERS' DIARRHEA: Status: ACTIVE | Noted: 2024-07-18

## 2024-07-18 PROBLEM — H93.13 SUBJECTIVE TINNITUS OF BOTH EARS: Status: ACTIVE | Noted: 2024-07-18

## 2024-07-18 PROBLEM — K64.4 EXTERNAL HEMORRHOIDS: Status: ACTIVE | Noted: 2024-07-18

## 2024-07-18 PROBLEM — S99.919A ANKLE INJURY: Status: ACTIVE | Noted: 2024-07-18

## 2024-07-18 PROBLEM — H90.3 ASYMMETRICAL SENSORINEURAL HEARING LOSS: Status: ACTIVE | Noted: 2024-07-18

## 2024-07-18 PROBLEM — M54.2 CHRONIC NECK PAIN: Status: ACTIVE | Noted: 2024-07-18

## 2024-07-18 PROBLEM — S01.01XA SCALP LACERATION: Status: ACTIVE | Noted: 2024-07-18

## 2024-07-18 PROBLEM — J02.9 SORE THROAT: Status: ACTIVE | Noted: 2024-07-18

## 2024-07-18 PROBLEM — M10.9 GOUT: Status: ACTIVE | Noted: 2024-07-18

## 2024-07-18 PROBLEM — H81.319 VERTIGO, AURAL: Status: ACTIVE | Noted: 2024-07-18

## 2024-07-18 PROBLEM — M17.12 PRIMARY LOCALIZED OSTEOARTHROSIS OF LEFT LOWER LEG: Status: ACTIVE | Noted: 2024-07-18

## 2024-07-18 PROBLEM — M25.562 LEFT KNEE PAIN: Status: ACTIVE | Noted: 2024-07-18

## 2024-07-18 PROBLEM — M25.511 PAIN OF RIGHT SHOULDER REGION: Status: ACTIVE | Noted: 2024-07-18

## 2024-07-18 PROBLEM — K21.00 REFLUX ESOPHAGITIS: Status: ACTIVE | Noted: 2024-07-18

## 2024-07-18 PROBLEM — M25.529 ELBOW PAIN: Status: ACTIVE | Noted: 2024-07-18

## 2024-07-18 PROBLEM — M25.9 DISORDER OF SHOULDER: Status: ACTIVE | Noted: 2024-07-18

## 2024-07-18 PROBLEM — R04.0 FREQUENT EPISTAXIS: Status: ACTIVE | Noted: 2024-07-18

## 2024-07-18 PROBLEM — M19.90 OSTEOARTHRITIS: Status: ACTIVE | Noted: 2024-07-18

## 2024-07-18 PROCEDURE — 3008F BODY MASS INDEX DOCD: CPT | Performed by: INTERNAL MEDICINE

## 2024-07-18 PROCEDURE — 1036F TOBACCO NON-USER: CPT | Performed by: INTERNAL MEDICINE

## 2024-07-23 ENCOUNTER — OFFICE VISIT (OUTPATIENT)
Dept: SURGERY | Facility: CLINIC | Age: 59
End: 2024-07-23
Payer: COMMERCIAL

## 2024-07-23 VITALS
HEART RATE: 75 BPM | BODY MASS INDEX: 27.01 KG/M2 | TEMPERATURE: 97 F | OXYGEN SATURATION: 99 % | SYSTOLIC BLOOD PRESSURE: 128 MMHG | WEIGHT: 227.8 LBS | DIASTOLIC BLOOD PRESSURE: 75 MMHG

## 2024-07-23 DIAGNOSIS — K40.90 INGUINAL HERNIA OF LEFT SIDE WITHOUT OBSTRUCTION OR GANGRENE: ICD-10-CM

## 2024-07-23 DIAGNOSIS — K40.90 UNILATERAL INGUINAL HERNIA WITHOUT OBSTRUCTION OR GANGRENE, RECURRENCE NOT SPECIFIED: ICD-10-CM

## 2024-07-23 PROCEDURE — 99215 OFFICE O/P EST HI 40 MIN: CPT | Performed by: STUDENT IN AN ORGANIZED HEALTH CARE EDUCATION/TRAINING PROGRAM

## 2024-07-23 PROCEDURE — 99205 OFFICE O/P NEW HI 60 MIN: CPT | Performed by: STUDENT IN AN ORGANIZED HEALTH CARE EDUCATION/TRAINING PROGRAM

## 2024-07-23 PROCEDURE — 1036F TOBACCO NON-USER: CPT | Performed by: STUDENT IN AN ORGANIZED HEALTH CARE EDUCATION/TRAINING PROGRAM

## 2024-07-23 ASSESSMENT — PAIN SCALES - GENERAL: PAINLEVEL: 2

## 2024-07-23 ASSESSMENT — ENCOUNTER SYMPTOMS: DEPRESSION: 0

## 2024-07-23 NOTE — PROGRESS NOTES
History Of Present Illness  Patient is a 59 y.o. male who presents with an increasingly symptomatic left inguinal hernia.  He does have intermittent episodes of pressure-like dull pain in the left groin.  His past surgical history is significant for a open right inguinal hernia repair and a laparoscopic inguinal hernia repair in [place], he is unsure which side that was performed on.  He denies any episodes of incarceration or strangulation, denies any overlying skin changes.  He denies any fevers, chills, nausea, vomiting.  Denies any issues with urination.  Denies any cardiac or respiratory issues, denies any other past abdominal surgeries.     Past Medical History  Past Medical History:   Diagnosis Date    Fracture of nasal bones, initial encounter for closed fracture     Fractured nose    Personal history of (healed) traumatic fracture     History of fracture of finger    Personal history of other diseases of the digestive system     History of hemorrhoids    Unspecified injury of unspecified ankle, initial encounter     Ankle injury       Surgical History  Past Surgical History:   Procedure Laterality Date    HEMORRHOID SURGERY  06/27/2014    Hemorrhoidectomy    HERNIA REPAIR          Social History  He reports that he has never smoked. He has never used smokeless tobacco. He reports current alcohol use of about 3.0 standard drinks of alcohol per week. He reports that he does not use drugs.    Family History  Family History   Problem Relation Name Age of Onset    Stroke Mother      Leukemia Father          Allergies  Patient has no known allergies.    Review of Systems  - CONSTITUTIONAL: Denies fever and chills.  - HEENT: Denies changes in vision and hearing.  - RESPIRATORY: Denies SOB and cough.  - CV: Denies palpitations and CP.  - GI: Denies abdominal pain, nausea, vomiting and diarrhea.  - : Denies dysuria and urinary frequency.  - MSK: Denies myalgia and joint pain.  - SKIN: Denies rash and pruritus.  -  NEUROLOGICAL: Denies headache and syncope.  - PSYCHIATRIC: Denies recent changes in mood. Denies anxiety and depression.     Physical Exam  - GENERAL: Alert and oriented x 3. No acute distress. Well-nourished.  - EYES: EOMI. Anicteric.  - HENT: Moist mucous membranes. No scleral icterus. No cervical lymphadenopathy.  - LUNGS: Breathing comfortably on room air. No accessory muscle use, no distress.  - CARDIOVASCULAR: Regular rate and rhythm. No murmur. No JVD.  - ABDOMEN: Soft, non-tender and non-distended. No palpable masses.  Reducible small left inguinal hernia.  - EXTREMITIES: No edema. Non-tender.  - SKIN: No rashes or lesions. Warm.  - NEUROLOGIC: No focal neurological deficits. CN II-XII grossly intact, but not individually tested.  - PSYCHIATRIC: Cooperative. Appropriate mood and affect.    Last Recorded Vitals  Blood pressure 128/75, pulse 75, temperature 36.1 °C (97 °F), temperature source Temporal, weight 103 kg (227 lb 12.8 oz), SpO2 99%.    Relevant Results  CT abdomen pelvis wo IV contrast    Result Date: 6/29/2024  Interpreted By:  Jatinder Dickinson, STUDY: CT ABDOMEN PELVIS WO IV CONTRAST;  6/28/2024 10:05 am   INDICATION: Signs/Symptoms:left nephrolothiasis.   COMPARISON: Ultrasound 06/11/2024   ACCESSION NUMBER(S): [id number]   ORDERING CLINICIAN: HARJEET COVARRUBIAS   TECHNIQUE: CT of the abdomen and pelvis was performed. Contiguous axial images were obtained at 3 mm slice thickness through the abdomen and pelvis. Coronal and sagittal reconstructions at 3 mm slice thickness were performed.  No intravenous contrast was administered.   FINDINGS: Please note that the evaluation of vessels, lymph nodes and organs is limited without intravenous contrast.   LOWER CHEST: Mild patchy bibasilar infiltrates/atelectasis particularly anteriorly on the right. Partially imaged at least small pericardial effusion.   ABDOMEN:   LIVER: Within normal limits.   BILE DUCTS: No significant biliary ductal dilatation.    GALLBLADDER: No calcified stones. No wall thickening.   PANCREAS: Within normal limits.   SPLEEN: Multiple vague hypoattenuating nodular foci throughout the spleen for example up to 9 mm image 42 and 13 mm image 59.   ADRENAL GLANDS: Mild low-density adrenal thickening/nodularity bilaterally likely due to adenomas and/or hyperplasia.   KIDNEYS AND URETERS: Approximate 1.1 cm focal calcification or stone midpole left renal cortex, correlates with finding from previous ultrasound. No hydronephrosis. No ureteral stones.   PELVIS:   BLADDER: Mild nonspecific urinary bladder wall thickening.   REPRODUCTIVE ORGANS: Enlarged prostate gland.   BOWEL: Stomach distended with ingested contents. Mildly distended fluid filled and fecalized small bowel loops throughout particularly mid to lower abdomen and pelvis with some scattered air-fluid levels. Prominent colonic stool. Appendix within normal limits. There is some colonic diverticulosis at the sigmoid and distal colon. Mild wall thickening versus incomplete distention at the distal colon. No definite acute inflammatory changes.   VESSELS: Mild to moderateatherosclerotic changes are noted involving the aorta and branching vessels. There is no aneurysmal dilatation. IVC appears normal in caliber.   PERITONEUM/RETROPERITONEUM/LYMPH NODES: No ascites or free air, no fluid collection.  1 cm nodule anterior to the spleen felt probably to represent a splenule. Mildly prominent nonspecific mesenteric nodes scattered throughout up to 7 mm short axis. Mildly prominent nonspecific inguinal nodes up to 11 mm short axis. Mildly prominent nonspecific retroperitoneal nodes up to 8 mm short axis.   ABDOMINAL WALL: Multiple densities just beneath the right anterolateral abdominal wall inferiorly probably relating to sequela of previous hernia repair. Small right and moderate left fat containing inguinal hernias. Small fat containing umbilical hernia.   BONES: Bones appear demineralized. Mild  anterolisthesis L5 on S1. Mild retrolisthesis L2 on L3 and L3 on L4. Focal irregular lucencies at the inferior endplates L2 through L4 measuring up to 1.4 cm at L2, may represent Schmorl's nodes. Marked arthritic changes left hip and moderate arthritic changes right hip. Multilevel degenerative changes visualized spine.At least moderate central canal stenosis L4-L5 and L5-S1.       1.  Approximate 1.1 cm focal calcification or stone midpole left renal cortex correlates with finding from recent ultrasound. No hydronephrosis or ureteral stones. 2. Enlarged prostate gland. Correlate with serum PSA. Nonspecific urinary bladder wall thickening. 3. Stomach distended with ingested contents. The possibility of partial or developing gastric outlet obstruction cannot be entirely excluded. There are also mildly distended fluid filled and fecalized small bowel loops scattered throughout could reflect nonspecific ileus/enteritis. Clinical correlation and follow-up advised. 4. Prominent colonic stool throughout. Mild diverticulosis with some associated wall thickening versus incomplete distention at the distal colon. No definite regional inflammatory change. Correlation with colonoscopy may be considered as a means of further assessment. 5. Multiple vague hypoattenuating foci throughout the spleen incompletely characterized. Follow-up warranted and consider MRI for further assessment. 6. Mildly prominent nonspecific abdominopelvic nodes as above. 7. Multiple densities just beneath the right anterolateral abdominal wall inferiorly probably relating to sequela of previous hernia repair. Small right and moderate left fat containing inguinal hernias. Small fat containing umbilical hernia. 8. Multilevel degenerative changes visualized spine. Irregular hypoattenuating foci at the inferior endplates L2 through L4 as described probably representing Schmorl's nodes although other etiologies are not entirely excluded. Mild anterolisthesis  L5 on S1 and retrolisthesis L2 on L3 and L3 on L4. At least moderate central canal stenosis L4-L5 and L5-S1. Correlation with MRI may be considered as a means of further assessment. 9. Additional findings as above.     MACRO: None   Signed by: Jatinder Ngorahul 6/29/2024 10:59 AM Dictation workstation:   [id number]       Assessment and Plan  Patient is a 59 y.o. male who presents to discuss further workup and or management of a symptomatic left inguinal hernia.  The etiology, pathophysiology, natural course, various treatment options for inguinal hernias were explained to the patient.  He thinks his previous laparoscopic repair was on the right side for a recurrent right inguinal hernia repair after an open right inguinal hernia repair 30 years ago.  Therefore, we will plan for a laparoscopic left inguinal hernia repair.  Therefore the risk and benefits of surgery were explained.  The risks of bleeding, infection, nonresolution of symptoms, hernia recurrence, anesthesia complications were all explained to the patient he voiced understanding.  Informed consent was signed in the clinic.  His questions were answered and he will be scheduled in the near future for the above procedure without the need for PAT.    Rafi Faye MD    No Opioids vs. Minimal Opioids Following Inguinal Hernia Repair: A Non-Inferiority Randomized Clinical Trial  Steven Brice, 1965, [id number]  [phone]  [email or web address]    Patient consented for study? Yes     IRB NO.: [id number]    : Rafi Faye MD    Phone: [phone]    COORDINATOR/Research Nurse/: Georgina Montejo    Phone/email: [phone],  [email or web address]    Consenting was performed by the attending surgeon, in a face-to-face manner, during preoperative evaluation at the General Surgery clinic.    The study protocol was discussed in detail with the patient. All study procedures were explained to the  subject, including randomization, the two possible study interventions for all patients participating in the study. The importance of follow up compliance was stressed. The risks, benefits, alternatives to participation and potential costs were discussed.    All patient questions were addressed and answered. Patient has read and understood the study procedures and requirements. Patient has agreed to proceed with trial participation and consent signed. Copy of the signed consent provided to the patient.    INCLUSION CRITERIA Yes No   1.  The patient is > 18 years of age [x] []   2.  Elective inguinal hernia repair with mesh [x] []   3. The patient is willing and able to give written informed consent [x] []        EXCLUSION CRITERIA Yes No   1. The patient lacks English language fluency or cannot understand the consent form/study procedures [] [x]   2. Cannot tolerate general anesthesia [] [x]   3. Cannot tolerate opioids or NSAIDs [] [x]   4. Currently taking opioids for chronic pain management [] [x]   5. Surgery requiring extensive dissection/hernia sac reduction or additional procedures [] [x]   6. Surgery requiring inpatient admission postoperatively [] [x]     Preoperative inguinal hernia patient reported outcome form completed today in clinic  Operative date: 8/28/2024

## 2024-07-28 PROBLEM — K40.90 INGUINAL HERNIA OF LEFT SIDE WITHOUT OBSTRUCTION OR GANGRENE: Status: ACTIVE | Noted: 2024-07-23

## 2024-07-30 ENCOUNTER — HOSPITAL ENCOUNTER (OUTPATIENT)
Dept: RADIOLOGY | Facility: HOSPITAL | Age: 59
Discharge: HOME | End: 2024-07-30
Payer: COMMERCIAL

## 2024-07-30 DIAGNOSIS — R97.20 PSA ELEVATION: ICD-10-CM

## 2024-07-30 PROCEDURE — 2550000001 HC RX 255 CONTRASTS: Performed by: STUDENT IN AN ORGANIZED HEALTH CARE EDUCATION/TRAINING PROGRAM

## 2024-07-30 PROCEDURE — A9575 INJ GADOTERATE MEGLUMI 0.1ML: HCPCS | Performed by: STUDENT IN AN ORGANIZED HEALTH CARE EDUCATION/TRAINING PROGRAM

## 2024-07-30 PROCEDURE — 76498 UNLISTED MR PROCEDURE: CPT | Performed by: RADIOLOGY

## 2024-07-30 PROCEDURE — 72197 MRI PELVIS W/O & W/DYE: CPT | Performed by: RADIOLOGY

## 2024-07-30 PROCEDURE — 72197 MRI PELVIS W/O & W/DYE: CPT

## 2024-07-30 RX ORDER — GADOTERATE MEGLUMINE 376.9 MG/ML
20 INJECTION INTRAVENOUS
Status: COMPLETED | OUTPATIENT
Start: 2024-07-30 | End: 2024-07-30

## 2024-08-02 ENCOUNTER — APPOINTMENT (OUTPATIENT)
Dept: GASTROENTEROLOGY | Facility: CLINIC | Age: 59
End: 2024-08-02
Payer: COMMERCIAL

## 2024-08-02 DIAGNOSIS — K59.09 CHRONIC CONSTIPATION: Primary | ICD-10-CM

## 2024-08-02 PROCEDURE — 99213 OFFICE O/P EST LOW 20 MIN: CPT

## 2024-08-02 RX ORDER — TAMSULOSIN HYDROCHLORIDE 0.4 MG/1
0.4 CAPSULE ORAL DAILY
COMMUNITY

## 2024-08-02 ASSESSMENT — ENCOUNTER SYMPTOMS
ANAL BLEEDING: 0
NAUSEA: 0
FATIGUE: 0
ABDOMINAL PAIN: 0
DIARRHEA: 0
VOMITING: 0
COUGH: 0
CONSTIPATION: 1
TROUBLE SWALLOWING: 0
FEVER: 0
SHORTNESS OF BREATH: 0
RECTAL PAIN: 0
ABDOMINAL DISTENTION: 0
BLOOD IN STOOL: 0
APPETITE CHANGE: 0
CHILLS: 0

## 2024-08-02 NOTE — ASSESSMENT & PLAN NOTE
Symptoms most suggestive of IBS-C versus chronic constipation  -Start daily MiraLAX    Follow-up as needed

## 2024-08-02 NOTE — PATIENT INSTRUCTIONS
Your symptoms are suggestive IBS-C. I recommend taking 1 capful of Miralax daily in 8 oz of liquid.  This is to help move bowels and soften stool.    Follow up as needed

## 2024-08-02 NOTE — PROGRESS NOTES
Subjective     History of Present Illness:   Steven Brice is a 59 y.o. male with PMHx of esophageal dysphagia, GERD, MV disease who presents to GI clinic for further evaluation of pelvic CT reading    Today, patient feels constipation for the past year and has had to take metamucil daily, a few times weekly needs dulcolax or miralax. Prior was moving bowels daily.  States he exercises and eats healthy balanced diet, drinks enough water.  Has had higher stress for 1.5 yrs b/c of work.  Is getting surgery for hernia.  Is straining to move his bowels which makes his hernia irritated in his groin area.  Patient states prior to CT scan he ate a meal, was not vomiting and did not have diarrhea at the time, denies abdominal pain.  Denies diarrhea, dyspepsia, melena, hematochezia, dysphagia, unintentional weight loss  2024 CT scan suggestive of possible enteritis, ileus, constipation.    Variable- 3-4 times weekly  ETOH, denies smoking, social marijuana  Denies fxh GI cancer or IBD  Abdominal Surgeries: Hernia repair, hemorrhoidectomy    Last colonoscopy 2024 Dr. Preston for screenin diminutive TA ascending, 4 mm TA transverse, diverticulosis, internal hemorrhoids.  Repeat 5 years  Last EGD 2021 Dr. Preston for dysphagia: Grade C esophagitis, erythematous antrum.  Pathology: Mild chronic gastritis, negative H. pylori      Past Medical History  As per HPI.     Social History  he  reports that he has never smoked. He has never used smokeless tobacco. He reports current alcohol use of about 3.0 standard drinks of alcohol per week. He reports that he does not use drugs.     Family History  his family history includes Leukemia in his father; Stroke in his mother.     Review of Systems  Review of Systems   Constitutional:  Negative for appetite change, chills, fatigue and fever.   HENT:  Negative for trouble swallowing.    Respiratory:  Negative for cough and shortness of breath.    Gastrointestinal:  Positive for  constipation. Negative for abdominal distention, abdominal pain, anal bleeding, blood in stool, diarrhea, nausea, rectal pain and vomiting.       Allergies  No Known Allergies    Medications  Current Outpatient Medications   Medication Instructions    tamsulosin (FLOMAX) 0.4 mg, oral, Daily        Objective   There were no vitals taken for this visit.   Physical Exam  Constitutional:       Appearance: Normal appearance. He is normal weight.   HENT:      Mouth/Throat:      Mouth: Mucous membranes are dry.      Pharynx: Oropharynx is clear.   Cardiovascular:      Rate and Rhythm: Normal rate and regular rhythm.   Pulmonary:      Effort: Pulmonary effort is normal.      Breath sounds: Normal breath sounds. No wheezing or rhonchi.   Abdominal:      General: Abdomen is flat. Bowel sounds are normal. There is no distension.      Palpations: Abdomen is soft. There is no hepatomegaly.      Tenderness: There is no abdominal tenderness. There is no guarding or rebound. Negative signs include Adam's sign.      Hernia: No hernia is present.   Musculoskeletal:         General: Normal range of motion.   Skin:     General: Skin is warm and dry.   Neurological:      General: No focal deficit present.      Mental Status: He is alert and oriented to person, place, and time.   Psychiatric:         Mood and Affect: Mood normal.         Behavior: Behavior normal.           Lab Results   Component Value Date    WBC 6.2 07/25/2018    HGB 15.0 07/25/2018    HCT 42.1 07/25/2018     07/25/2018     Lab Results   Component Value Date     04/29/2021    K 4.8 04/29/2021     04/29/2021    CO2 27 04/29/2021    BUN 18 04/29/2021    CREATININE 1.10 04/29/2021    CALCIUM 9.7 04/29/2021    PROT 7.1 07/25/2018    BILITOT 0.6 07/25/2018    ALKPHOS 45 07/25/2018    ALT 18 07/25/2018    AST 16 07/25/2018    GLUCOSE 88 04/29/2021           Stveen Brice is a 59 y.o. male who presents to GI clinic for chronic constipation versus  IBS-C.  Chronic constipation  Symptoms most suggestive of IBS-C versus chronic constipation  -Start daily MiraLAX    Follow-up as needed         Lucretia Montoya, APRN-CNP

## 2024-08-09 ENCOUNTER — TRANSCRIBE ORDERS (OUTPATIENT)
Dept: ORTHOPEDIC SURGERY | Facility: HOSPITAL | Age: 59
End: 2024-08-09
Payer: COMMERCIAL

## 2024-08-09 DIAGNOSIS — M54.2 NECK PAIN: ICD-10-CM

## 2024-08-12 ENCOUNTER — LAB REQUISITION (OUTPATIENT)
Dept: LAB | Facility: HOSPITAL | Age: 59
End: 2024-08-12
Payer: COMMERCIAL

## 2024-08-12 DIAGNOSIS — R19.7 DIARRHEA, UNSPECIFIED: ICD-10-CM

## 2024-08-12 PROCEDURE — 87328 CRYPTOSPORIDIUM AG IA: CPT

## 2024-08-12 PROCEDURE — 87506 IADNA-DNA/RNA PROBE TQ 6-11: CPT

## 2024-08-12 PROCEDURE — 87329 GIARDIA AG IA: CPT

## 2024-08-13 ENCOUNTER — OFFICE VISIT (OUTPATIENT)
Dept: ORTHOPEDIC SURGERY | Facility: CLINIC | Age: 59
End: 2024-08-13
Payer: COMMERCIAL

## 2024-08-13 ENCOUNTER — HOSPITAL ENCOUNTER (OUTPATIENT)
Dept: RADIOLOGY | Facility: CLINIC | Age: 59
Discharge: HOME | End: 2024-08-13
Payer: COMMERCIAL

## 2024-08-13 ENCOUNTER — APPOINTMENT (OUTPATIENT)
Dept: UROLOGY | Facility: HOSPITAL | Age: 59
End: 2024-08-13
Payer: COMMERCIAL

## 2024-08-13 ENCOUNTER — HOSPITAL ENCOUNTER (OUTPATIENT)
Facility: HOSPITAL | Age: 59
Setting detail: OUTPATIENT SURGERY
End: 2024-08-13
Attending: STUDENT IN AN ORGANIZED HEALTH CARE EDUCATION/TRAINING PROGRAM | Admitting: STUDENT IN AN ORGANIZED HEALTH CARE EDUCATION/TRAINING PROGRAM
Payer: COMMERCIAL

## 2024-08-13 ENCOUNTER — PREP FOR PROCEDURE (OUTPATIENT)
Dept: UROLOGY | Facility: HOSPITAL | Age: 59
End: 2024-08-13

## 2024-08-13 VITALS — HEIGHT: 77 IN | BODY MASS INDEX: 26.8 KG/M2 | WEIGHT: 227 LBS

## 2024-08-13 DIAGNOSIS — M75.111 INCOMPLETE TEAR OF RIGHT ROTATOR CUFF, UNSPECIFIED WHETHER TRAUMATIC: ICD-10-CM

## 2024-08-13 DIAGNOSIS — R97.20 PSA ELEVATION: Primary | ICD-10-CM

## 2024-08-13 DIAGNOSIS — G89.29 CHRONIC RIGHT SHOULDER PAIN: Primary | ICD-10-CM

## 2024-08-13 DIAGNOSIS — Z79.2 PROPHYLACTIC ANTIBIOTIC: Primary | ICD-10-CM

## 2024-08-13 DIAGNOSIS — M25.511 CHRONIC RIGHT SHOULDER PAIN: Primary | ICD-10-CM

## 2024-08-13 DIAGNOSIS — M54.2 NECK PAIN: ICD-10-CM

## 2024-08-13 LAB

## 2024-08-13 PROCEDURE — 3008F BODY MASS INDEX DOCD: CPT | Performed by: ORTHOPAEDIC SURGERY

## 2024-08-13 PROCEDURE — 99213 OFFICE O/P EST LOW 20 MIN: CPT | Performed by: ORTHOPAEDIC SURGERY

## 2024-08-13 PROCEDURE — 72050 X-RAY EXAM NECK SPINE 4/5VWS: CPT

## 2024-08-13 PROCEDURE — 1036F TOBACCO NON-USER: CPT | Performed by: STUDENT IN AN ORGANIZED HEALTH CARE EDUCATION/TRAINING PROGRAM

## 2024-08-13 PROCEDURE — 99214 OFFICE O/P EST MOD 30 MIN: CPT | Performed by: STUDENT IN AN ORGANIZED HEALTH CARE EDUCATION/TRAINING PROGRAM

## 2024-08-13 PROCEDURE — 72050 X-RAY EXAM NECK SPINE 4/5VWS: CPT | Performed by: RADIOLOGY

## 2024-08-13 PROCEDURE — 1036F TOBACCO NON-USER: CPT | Performed by: ORTHOPAEDIC SURGERY

## 2024-08-13 RX ORDER — SODIUM CHLORIDE 9 MG/ML
100 INJECTION, SOLUTION INTRAVENOUS CONTINUOUS
OUTPATIENT
Start: 2024-08-13

## 2024-08-13 RX ORDER — CIPROFLOXACIN 2 MG/ML
400 INJECTION, SOLUTION INTRAVENOUS ONCE
OUTPATIENT
Start: 2024-08-13 | End: 2024-08-13

## 2024-08-13 RX ORDER — CIPROFLOXACIN 500 MG/1
500 TABLET ORAL 2 TIMES DAILY
Qty: 6 TABLET | Refills: 0 | Status: SHIPPED | OUTPATIENT
Start: 2024-08-13 | End: 2024-08-16

## 2024-08-13 ASSESSMENT — PAIN - FUNCTIONAL ASSESSMENT: PAIN_FUNCTIONAL_ASSESSMENT: NO/DENIES PAIN

## 2024-08-13 NOTE — PROGRESS NOTES
HPI:Steven Brice is a 59-year-old man who comes in today with complaints of right shoulder pain.  The pain is worse with abduction and/or forward elevation.  He denies arm pain.  No numbness and tingling.  Mild occasional neck pain.  He has completed physical therapy.  His shoulder surgeon has suggested shoulder surgery.      ROS:  Reviewed on EMR and patient intake sheet.    PMH/SH:   Reviewed on EMR and patient intake sheet.    Exam:  Physical Exam    Constitutional: Well appearing; no acute distress  Eyes: pupils are equal and round  Psych: normal affect  Respiratory: non-labored breathing  Cardiovascular: regular rate and rhythm  GI: non-distended abdomen  Musculoskeletal: Significant mechanical pain with abduction and internal rotation of the right shoulder  Neurologic: [4+]/5 strength in the upper extremities bilaterally]; [negative] Girard's; [no hyper-reflexia]    Radiology:  X-rays demonstrate moderate disc degeneration spondylosis at C5-6 and C6-C7    Diagnosis:  Right shoulder pain    Assessment and Plan:   59-year-old male with right shoulder pain attributable to his 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  He does not have any significant radiculopathy superimposed on his shoulder problems.  I recommend he follow-up with a shoulder surgeon for consultation regarding shoulder surgery.  I can see him back as needed.    The patient was in agreement with the plan. At the end of the visit today, the patient felt that all questions had been answered satisfactorily.  The patient was pleased with the visit and very appreciative for the care rendered.     Thank you very much for the kind referral.  It is a privilege, and a pleasure, to partner with you in the care of your patients.  I would be delighted to assist you with any further consultations as needed.      Nile Romo MD    Chief of Spine Surgery, [facility]  Director of Spine Service, [place]  [facility]  , Department of Orthopaedics  [facility] [employer]  [id number]  [place] [id number]  P: [phone]  [email or web address]    This note was dictated with voice recognition software.  It has not been proofread for grammatical errors, typographical mistakes or other semantic inconsistencies.

## 2024-08-13 NOTE — LETTER
August 13, 2024     Lilliam Stallings MD  [address]   [address]  [facility] [address]    Patient: Steven Brice   YOB: 1965   Date of Visit: 8/13/2024       Dear Dr. Lilliam Stallings MD:    Thank you for referring Steven Brice to me for evaluation. Below are my notes for this consultation.  If you have questions, please do not hesitate to call me. I look forward to following your patient along with you.       Sincerely,     Nile Romo MD      CC: No Recipients  ______________________________________________________________________________________    HPI:Steven Brice is a 59-year-old man who comes in today with complaints of right shoulder pain.  The pain is worse with abduction and/or forward elevation.  He denies arm pain.  No numbness and tingling.  Mild occasional neck pain.  He has completed physical therapy.  His shoulder surgeon has suggested shoulder surgery.      ROS:  Reviewed on EMR and patient intake sheet.    PMH/SH:   Reviewed on EMR and patient intake sheet.    Exam:  Physical Exam    Constitutional: Well appearing; no acute distress  Eyes: pupils are equal and round  Psych: normal affect  Respiratory: non-labored breathing  Cardiovascular: regular rate and rhythm  GI: non-distended abdomen  Musculoskeletal: Significant mechanical pain with abduction and internal rotation of the right shoulder  Neurologic: [4+]/5 strength in the upper extremities bilaterally]; [negative] Girard's; [no hyper-reflexia]    Radiology:  X-rays demonstrate moderate disc degeneration spondylosis at C5-6 and C6-C7    Diagnosis:  Right shoulder pain    Assessment and Plan:   59-year-old male with right shoulder pain attributable to his 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  He does not have any significant radiculopathy superimposed on his shoulder problems.  I recommend he follow-up with a shoulder surgeon for consultation regarding shoulder surgery.  I can see him back as needed.    The patient  was in agreement with the plan. At the end of the visit today, the patient felt that all questions had been answered satisfactorily.  The patient was pleased with the visit and very appreciative for the care rendered.     Thank you very much for the kind referral.  It is a privilege, and a pleasure, to partner with you in the care of your patients.  I would be delighted to assist you with any further consultations as needed.      Nile Romo MD    Chief of Spine Surgery, [facility]  Director of Spine Service, [facility]  , Department of Orthopaedics  [facility]  [address] [id number]  P: [phone]  [facility]    This note was dictated with voice recognition software.  It has not been proofread for grammatical errors, typographical mistakes or other semantic inconsistencies.

## 2024-08-13 NOTE — PROGRESS NOTES
Subjective   Patient ID: Steven Brice is a 59 y.o. male    HPI  59 y.o. male who presents for follow up with MR prostate results and with concerns about a focal calcification in the kidney, which could be a stone or calcium in the calyx. The patient is worried about the potential for the stone to move and cause severe pain, especially given their frequent travel for work. Upon review, the calcification appears to be a calcified cyst embedded in the kidney, not communicating with the renal pelvis, and thus unlikely to cause problems. The patient also has a history of elevated PSA, which raises concerns about prostate cancer. The patient has undergone a cystoscopy to assess prostate enlargement, which is separate from prostate cancer. The patient is also concerned about a possible hernia, colonic diverticulosis, an enlarged spleen, and lymphadenopathy. Follow-up with primary care and gastroenterology is recommended for these  issues.    The most recent CT abdomen pelvis wo IV contrast, conducted on 6/28/2024, revealed:  1.  Approximate 1.1 cm focal calcification or stone midpole left  renal cortex correlates with finding from recent ultrasound. No  hydronephrosis or ureteral stones.  2. Enlarged prostate gland. Correlate with serum PSA. Nonspecific  urinary bladder wall thickening.  3. Stomach distended with ingested contents. The possibility of  partial or developing gastric outlet obstruction cannot be entirely  excluded. There are also mildly distended fluid filled and fecalized  small bowel loops scattered throughout could reflect nonspecific  ileus/enteritis. Clinical correlation and follow-up advised.  4. Prominent colonic stool throughout. Mild diverticulosis with some  associated wall thickening versus incomplete distention at the distal  colon. No definite regional inflammatory change. Correlation with  colonoscopy may be considered as a means of further assessment.  5. Multiple vague hypoattenuating foci  throughout the spleen  incompletely characterized. Follow-up warranted and consider MRI for  further assessment.  6. Mildly prominent nonspecific abdominopelvic nodes as above.  7. Multiple densities just beneath the right anterolateral abdominal  wall inferiorly probably relating to sequela of previous hernia  repair. Small right and moderate left fat containing inguinal  hernias. Small fat containing umbilical hernia.  8. Multilevel degenerative changes visualized spine. Irregular  hypoattenuating foci at the inferior endplates L2 through L4 as  described probably representing Schmorl's nodes although other  etiologies are not entirely excluded. Mild anterolisthesis L5 on S1  and retrolisthesis L2 on L3 and L3 on L4. At least moderate central  canal stenosis L4-L5 and L5-S1. Correlation with MRI may be  considered as a means of further assessment.  9. Additional findings as above.    The most recent MR Prostate, conducted on 7/30/2024, revealed:  1. 2.1 cm right posterolateral peripheral zone lesion at the apical  gland (PI-RADS 5). Long capsular contact raises concern for  microscopic extracapsular extension.  2. No pelvic lymphadenopathy is evident.  3. Changes associated with BPH within the transitional zone (PI-RADS  2).          Review of Systems    All systems were reviewed. Anything negative was noted in the HPI.    Objective   Physical Exam    General: Well developed, well nourished, alert and cooperative, appears in no acute distress   Eyes: Non-injected conjunctiva, sclera clear, no proptosis   Cardiac: Extremities are warm and well perfused. No edema, cyanosis or pallor   Lungs: Breathing is easy, non-labored. Speaking in clear and complete sentences. Normal diaphragmatic movement   MSK: Ambulatory with steady gait, unassisted   Neuro: Alert and oriented to person, place, and time   Psych: Demonstrates good judgment and reason, without hallucinations, abnormal affect or abnormal behaviors   Skin: No  obvious lesions, no rashes       No CVA tenderness bilaterally   No suprapubic pain or discomfort       Past Medical History:   Diagnosis Date    Fracture of nasal bones, initial encounter for closed fracture     Fractured nose    Personal history of (healed) traumatic fracture     History of fracture of finger    Personal history of other diseases of the digestive system     History of hemorrhoids    Unspecified injury of unspecified ankle, initial encounter     Ankle injury         Past Surgical History:   Procedure Laterality Date    HEMORRHOID SURGERY  06/27/2014    Hemorrhoidectomy    HERNIA REPAIR             Assessment/Plan   Elevated PSA, PI-RADS 5 on MR prostate    59 y.o. male who presents for the above condition,  We had a very long and extensive discussion with the patient regarding elevated PSA. I explained to him the pathophysiology, differential diagnosis, risk factor, associated conditions, and management. I explained to him that elevated PSA could be either due to BPH, prostate infection or inflammation or prostate adenocarcinoma. We discussed at length the risk, benefit, potential complication, adverse events of prostate biopsy including pain, discomfort, urinary retention, hematuria, pneumaturia, hematospermia. Explained to him that there is a 2% to 5% risk of complicated urinary infection and urosepsis. Explained to him indicates it happens, he will need to be admitted for IV antibiotic for 2 to 3 days at the hospital.       Plan:  - Schedule for MR fusion biopsy on Sep 27, 2024.        8/13/2024    Scribe Attestation  By signing my name below, IChad Scribe   attest that this documentation has been prepared under the direction and in the presence of Dr. Omar Vaughan

## 2024-08-15 LAB
CRYPTOSP AG STL QL IA: NEGATIVE
G LAMBLIA AG STL QL IA: NEGATIVE

## 2024-08-17 LAB — O+P STL MICRO: NEGATIVE

## 2024-08-27 ENCOUNTER — TELEPHONE (OUTPATIENT)
Dept: UROLOGY | Facility: HOSPITAL | Age: 59
End: 2024-08-27
Payer: COMMERCIAL

## 2024-08-27 NOTE — TELEPHONE ENCOUNTER
Spoke with patient; he has chosen not to have the procedure with Dr. Vaughan on 9-27-24.  He has been referred to Dr. Vaughan's colleague Dr. Diallo for a consult.  Dr. Diallo's nurse notified.    Darshana Estrada LPN

## 2024-08-28 ENCOUNTER — HOSPITAL ENCOUNTER (OUTPATIENT)
Facility: HOSPITAL | Age: 59
Setting detail: OUTPATIENT SURGERY
Discharge: HOME | End: 2024-08-28
Attending: STUDENT IN AN ORGANIZED HEALTH CARE EDUCATION/TRAINING PROGRAM | Admitting: STUDENT IN AN ORGANIZED HEALTH CARE EDUCATION/TRAINING PROGRAM
Payer: COMMERCIAL

## 2024-08-28 ENCOUNTER — ANESTHESIA (OUTPATIENT)
Dept: OPERATING ROOM | Facility: HOSPITAL | Age: 59
End: 2024-08-28
Payer: COMMERCIAL

## 2024-08-28 ENCOUNTER — ANESTHESIA EVENT (OUTPATIENT)
Dept: OPERATING ROOM | Facility: HOSPITAL | Age: 59
End: 2024-08-28
Payer: COMMERCIAL

## 2024-08-28 VITALS
DIASTOLIC BLOOD PRESSURE: 76 MMHG | WEIGHT: 227.07 LBS | RESPIRATION RATE: 17 BRPM | TEMPERATURE: 97.2 F | BODY MASS INDEX: 26.81 KG/M2 | SYSTOLIC BLOOD PRESSURE: 137 MMHG | HEART RATE: 56 BPM | OXYGEN SATURATION: 99 % | HEIGHT: 77 IN

## 2024-08-28 DIAGNOSIS — K40.90 INGUINAL HERNIA OF LEFT SIDE WITHOUT OBSTRUCTION OR GANGRENE: Primary | ICD-10-CM

## 2024-08-28 PROCEDURE — 49650 LAP ING HERNIA REPAIR INIT: CPT | Performed by: STUDENT IN AN ORGANIZED HEALTH CARE EDUCATION/TRAINING PROGRAM

## 2024-08-28 PROCEDURE — 2500000004 HC RX 250 GENERAL PHARMACY W/ HCPCS (ALT 636 FOR OP/ED): Performed by: NURSE ANESTHETIST, CERTIFIED REGISTERED

## 2024-08-28 PROCEDURE — 2500000005 HC RX 250 GENERAL PHARMACY W/O HCPCS: Performed by: STUDENT IN AN ORGANIZED HEALTH CARE EDUCATION/TRAINING PROGRAM

## 2024-08-28 PROCEDURE — 7100000001 HC RECOVERY ROOM TIME - INITIAL BASE CHARGE: Performed by: STUDENT IN AN ORGANIZED HEALTH CARE EDUCATION/TRAINING PROGRAM

## 2024-08-28 PROCEDURE — 7100000002 HC RECOVERY ROOM TIME - EACH INCREMENTAL 1 MINUTE: Performed by: STUDENT IN AN ORGANIZED HEALTH CARE EDUCATION/TRAINING PROGRAM

## 2024-08-28 PROCEDURE — 2500000004 HC RX 250 GENERAL PHARMACY W/ HCPCS (ALT 636 FOR OP/ED): Performed by: STUDENT IN AN ORGANIZED HEALTH CARE EDUCATION/TRAINING PROGRAM

## 2024-08-28 PROCEDURE — 7100000010 HC PHASE TWO TIME - EACH INCREMENTAL 1 MINUTE: Performed by: STUDENT IN AN ORGANIZED HEALTH CARE EDUCATION/TRAINING PROGRAM

## 2024-08-28 PROCEDURE — 2500000005 HC RX 250 GENERAL PHARMACY W/O HCPCS: Performed by: NURSE ANESTHETIST, CERTIFIED REGISTERED

## 2024-08-28 PROCEDURE — C1781 MESH (IMPLANTABLE): HCPCS | Performed by: STUDENT IN AN ORGANIZED HEALTH CARE EDUCATION/TRAINING PROGRAM

## 2024-08-28 PROCEDURE — 2500000001 HC RX 250 WO HCPCS SELF ADMINISTERED DRUGS (ALT 637 FOR MEDICARE OP): Performed by: STUDENT IN AN ORGANIZED HEALTH CARE EDUCATION/TRAINING PROGRAM

## 2024-08-28 PROCEDURE — 3700000001 HC GENERAL ANESTHESIA TIME - INITIAL BASE CHARGE: Performed by: STUDENT IN AN ORGANIZED HEALTH CARE EDUCATION/TRAINING PROGRAM

## 2024-08-28 PROCEDURE — 2780000003 HC OR 278 NO HCPCS: Performed by: STUDENT IN AN ORGANIZED HEALTH CARE EDUCATION/TRAINING PROGRAM

## 2024-08-28 PROCEDURE — 7100000009 HC PHASE TWO TIME - INITIAL BASE CHARGE: Performed by: STUDENT IN AN ORGANIZED HEALTH CARE EDUCATION/TRAINING PROGRAM

## 2024-08-28 PROCEDURE — 2720000007 HC OR 272 NO HCPCS: Performed by: STUDENT IN AN ORGANIZED HEALTH CARE EDUCATION/TRAINING PROGRAM

## 2024-08-28 PROCEDURE — 3600000004 HC OR TIME - INITIAL BASE CHARGE - PROCEDURE LEVEL FOUR: Performed by: STUDENT IN AN ORGANIZED HEALTH CARE EDUCATION/TRAINING PROGRAM

## 2024-08-28 PROCEDURE — 3700000002 HC GENERAL ANESTHESIA TIME - EACH INCREMENTAL 1 MINUTE: Performed by: STUDENT IN AN ORGANIZED HEALTH CARE EDUCATION/TRAINING PROGRAM

## 2024-08-28 PROCEDURE — 3600000009 HC OR TIME - EACH INCREMENTAL 1 MINUTE - PROCEDURE LEVEL FOUR: Performed by: STUDENT IN AN ORGANIZED HEALTH CARE EDUCATION/TRAINING PROGRAM

## 2024-08-28 DEVICE — 3DMAX™ MID ANATOMICAL MESH, LARGE, LEFT, 4" X 6", 10 X 16 CM
Type: IMPLANTABLE DEVICE | Site: INGUINAL | Status: FUNCTIONAL
Brand: 3DMAX™ MID ANATOMICAL MESH

## 2024-08-28 RX ORDER — BUPIVACAINE HCL/EPINEPHRINE 0.5-1:200K
VIAL (ML) INJECTION AS NEEDED
Status: DISCONTINUED | OUTPATIENT
Start: 2024-08-28 | End: 2024-08-28 | Stop reason: HOSPADM

## 2024-08-28 RX ORDER — OXYCODONE HYDROCHLORIDE 5 MG/1
5 TABLET ORAL EVERY 4 HOURS PRN
Status: DISCONTINUED | OUTPATIENT
Start: 2024-08-28 | End: 2024-08-28 | Stop reason: HOSPADM

## 2024-08-28 RX ORDER — SODIUM CHLORIDE, SODIUM LACTATE, POTASSIUM CHLORIDE, CALCIUM CHLORIDE 600; 310; 30; 20 MG/100ML; MG/100ML; MG/100ML; MG/100ML
100 INJECTION, SOLUTION INTRAVENOUS CONTINUOUS
Status: DISCONTINUED | OUTPATIENT
Start: 2024-08-28 | End: 2024-08-28 | Stop reason: HOSPADM

## 2024-08-28 RX ORDER — NORETHINDRONE AND ETHINYL ESTRADIOL 0.5-0.035
KIT ORAL AS NEEDED
Status: DISCONTINUED | OUTPATIENT
Start: 2024-08-28 | End: 2024-08-28

## 2024-08-28 RX ORDER — ONDANSETRON HYDROCHLORIDE 2 MG/ML
4 INJECTION, SOLUTION INTRAVENOUS ONCE AS NEEDED
Status: DISCONTINUED | OUTPATIENT
Start: 2024-08-28 | End: 2024-08-28 | Stop reason: HOSPADM

## 2024-08-28 RX ORDER — LABETALOL HYDROCHLORIDE 5 MG/ML
5 INJECTION, SOLUTION INTRAVENOUS ONCE AS NEEDED
Status: DISCONTINUED | OUTPATIENT
Start: 2024-08-28 | End: 2024-08-28 | Stop reason: HOSPADM

## 2024-08-28 RX ORDER — PROPOFOL 10 MG/ML
INJECTION, EMULSION INTRAVENOUS AS NEEDED
Status: DISCONTINUED | OUTPATIENT
Start: 2024-08-28 | End: 2024-08-28

## 2024-08-28 RX ORDER — ROCURONIUM BROMIDE 10 MG/ML
INJECTION, SOLUTION INTRAVENOUS AS NEEDED
Status: DISCONTINUED | OUTPATIENT
Start: 2024-08-28 | End: 2024-08-28

## 2024-08-28 RX ORDER — ONDANSETRON HYDROCHLORIDE 2 MG/ML
INJECTION, SOLUTION INTRAVENOUS AS NEEDED
Status: DISCONTINUED | OUTPATIENT
Start: 2024-08-28 | End: 2024-08-28

## 2024-08-28 RX ORDER — SODIUM CHLORIDE, SODIUM LACTATE, POTASSIUM CHLORIDE, CALCIUM CHLORIDE 600; 310; 30; 20 MG/100ML; MG/100ML; MG/100ML; MG/100ML
20 INJECTION, SOLUTION INTRAVENOUS CONTINUOUS
Status: DISCONTINUED | OUTPATIENT
Start: 2024-08-28 | End: 2024-08-28 | Stop reason: HOSPADM

## 2024-08-28 RX ORDER — LIDOCAINE HYDROCHLORIDE 10 MG/ML
0.1 INJECTION, SOLUTION EPIDURAL; INFILTRATION; INTRACAUDAL; PERINEURAL ONCE
Status: DISCONTINUED | OUTPATIENT
Start: 2024-08-28 | End: 2024-08-28 | Stop reason: HOSPADM

## 2024-08-28 RX ORDER — SODIUM CHLORIDE 0.9 G/100ML
IRRIGANT IRRIGATION AS NEEDED
Status: DISCONTINUED | OUTPATIENT
Start: 2024-08-28 | End: 2024-08-28 | Stop reason: HOSPADM

## 2024-08-28 RX ORDER — FENTANYL CITRATE 50 UG/ML
INJECTION, SOLUTION INTRAMUSCULAR; INTRAVENOUS AS NEEDED
Status: DISCONTINUED | OUTPATIENT
Start: 2024-08-28 | End: 2024-08-28

## 2024-08-28 RX ORDER — MIDAZOLAM HYDROCHLORIDE 1 MG/ML
INJECTION INTRAMUSCULAR; INTRAVENOUS AS NEEDED
Status: DISCONTINUED | OUTPATIENT
Start: 2024-08-28 | End: 2024-08-28

## 2024-08-28 RX ORDER — DIPHENHYDRAMINE HYDROCHLORIDE 50 MG/ML
12.5 INJECTION INTRAMUSCULAR; INTRAVENOUS ONCE AS NEEDED
Status: DISCONTINUED | OUTPATIENT
Start: 2024-08-28 | End: 2024-08-28 | Stop reason: HOSPADM

## 2024-08-28 RX ORDER — LIDOCAINE HYDROCHLORIDE 20 MG/ML
INJECTION, SOLUTION INFILTRATION; PERINEURAL AS NEEDED
Status: DISCONTINUED | OUTPATIENT
Start: 2024-08-28 | End: 2024-08-28

## 2024-08-28 RX ORDER — HYDROMORPHONE HYDROCHLORIDE 1 MG/ML
INJECTION, SOLUTION INTRAMUSCULAR; INTRAVENOUS; SUBCUTANEOUS AS NEEDED
Status: DISCONTINUED | OUTPATIENT
Start: 2024-08-28 | End: 2024-08-28

## 2024-08-28 RX ORDER — CEFAZOLIN 1 G/1
INJECTION, POWDER, FOR SOLUTION INTRAVENOUS AS NEEDED
Status: DISCONTINUED | OUTPATIENT
Start: 2024-08-28 | End: 2024-08-28

## 2024-08-28 ASSESSMENT — LIFESTYLE VARIABLES
HOW MANY STANDARD DRINKS CONTAINING ALCOHOL DO YOU HAVE ON A TYPICAL DAY: 3 OR 4
HOW OFTEN DO YOU HAVE A DRINK CONTAINING ALCOHOL: 4 OR MORE TIMES A WEEK
HAS A RELATIVE, FRIEND, DOCTOR, OR ANOTHER HEALTH PROFESSIONAL EXPRESSED CONCERN ABOUT YOUR DRINKING OR SUGGESTED YOU CUT DOWN: NO
SKIP TO QUESTIONS 9-10: 0
AUDIT TOTAL SCORE: -1
AUDIT-C TOTAL SCORE: 5
HAVE YOU OR SOMEONE ELSE BEEN INJURED AS A RESULT OF YOUR DRINKING: NO
HOW OFTEN DO YOU HAVE SIX OR MORE DRINKS ON ONE OCCASION: NEVER

## 2024-08-28 ASSESSMENT — PAIN SCALES - GENERAL
PAINLEVEL_OUTOF10: 0 - NO PAIN
PAINLEVEL_OUTOF10: 1
PAINLEVEL_OUTOF10: 0 - NO PAIN
PAINLEVEL_OUTOF10: 1
PAINLEVEL_OUTOF10: 1

## 2024-08-28 ASSESSMENT — PAIN DESCRIPTION - DESCRIPTORS
DESCRIPTORS: SORE
DESCRIPTORS: SORE

## 2024-08-28 ASSESSMENT — PAIN - FUNCTIONAL ASSESSMENT
PAIN_FUNCTIONAL_ASSESSMENT: 0-10
PAIN_FUNCTIONAL_ASSESSMENT: UNABLE TO SELF-REPORT
PAIN_FUNCTIONAL_ASSESSMENT: 0-10

## 2024-08-28 ASSESSMENT — COLUMBIA-SUICIDE SEVERITY RATING SCALE - C-SSRS
6. HAVE YOU EVER DONE ANYTHING, STARTED TO DO ANYTHING, OR PREPARED TO DO ANYTHING TO END YOUR LIFE?: NO
1. IN THE PAST MONTH, HAVE YOU WISHED YOU WERE DEAD OR WISHED YOU COULD GO TO SLEEP AND NOT WAKE UP?: NO
2. HAVE YOU ACTUALLY HAD ANY THOUGHTS OF KILLING YOURSELF?: NO

## 2024-08-28 ASSESSMENT — PAIN DESCRIPTION - ORIENTATION: ORIENTATION: LEFT

## 2024-08-28 ASSESSMENT — PAIN DESCRIPTION - LOCATION: LOCATION: ABDOMEN

## 2024-08-28 NOTE — ANESTHESIA PROCEDURE NOTES
Airway  Date/Time: 8/28/2024 10:34 AM  Urgency: elective    Airway not difficult    Staffing  Performed: CRNA   Authorized by: Patrick Haas MD    Performed by: KEYANNA Akbar-CRNA, TRICIA  Patient location during procedure: OR    Indications and Patient Condition  Indications for airway management: anesthesia and airway protection  Spontaneous ventilation: present  Sedation level: deep  Preoxygenated: yes  Patient position: sniffing  Mask difficulty assessment: 1 - vent by mask    Final Airway Details  Final airway type: endotracheal airway      Successful airway: ETT  Cuffed: yes   Successful intubation technique: video laryngoscopy  Facilitating devices/methods: intubating stylet  Endotracheal tube insertion site: oral  Cormack-Lehane Classification: grade I - full view of glottis  Placement verified by: chest auscultation and capnometry   Measured from: lips  ETT to lips (cm): 21  Number of attempts at approach: 1

## 2024-08-28 NOTE — ANESTHESIA POSTPROCEDURE EVALUATION
Patient: Steven Brice    Procedure Summary       Date: 08/28/24 Room / Location: [facility] OR 17 / Virtual [facility] OR    Anesthesia Start: 1026 Anesthesia Stop: 1208    Procedure: Laparoscopic Left Inguinal Hernia Repair (Left) Diagnosis:       Inguinal hernia of left side without obstruction or gangrene      (Inguinal hernia of left side without obstruction or gangrene [K40.90])    Surgeons: Rafi Faye MD Responsible Provider: Silvio Almonte MD    Anesthesia Type: general ASA Status: 2            Anesthesia Type: general    Vitals Value Taken Time   /68 08/28/24 1208   Temp 36 08/28/24 1208   Pulse 71 08/28/24 1208   Resp 15 08/28/24 1208   SpO2 95 % 08/28/24 1207   Vitals shown include unfiled device data.    Anesthesia Post Evaluation    Patient location during evaluation: PACU  Patient participation: waiting for patient participation  Level of consciousness: sedated  Pain management: adequate  Airway patency: patent  Cardiovascular status: acceptable  Respiratory status: acceptable, spontaneous ventilation and oral airway  Hydration status: acceptable  Postoperative Nausea and Vomiting: none      No notable events documented.

## 2024-08-28 NOTE — ANESTHESIA PREPROCEDURE EVALUATION
Call to pt and advised. She agrees with this.    Patient: Steven Brice    Procedure Information       Date/Time: 08/28/24 1045    Procedure: Laparoscopic Left Inguinal Hernia Repair (Left)    Location: [facility] A OR 17 / Virtual [facility] A OR    Surgeons: Rafi Faye MD            Relevant Problems   Cardiac   (+) Mitral valve disease      GI   (+) Esophageal dysphagia   (+) Esophageal reflux      Musculoskeletal   (+) Chronic neck pain   (+) Osteoarthritis   (+) Primary localized osteoarthrosis of left lower leg      HEENT   (+) Asymmetrical sensorineural hearing loss   (+) Bilateral sensorineural hearing loss   (+) Chronic sinusitis, unspecified   (+) Hearing loss      ID   (+) Blastocystis hominis infection   (+) COVID-19   (+) Travelers' diarrhea       Clinical information reviewed:    Allergies                NPO Detail:  No data recorded     Physical Exam    Airway  Mallampati: II  TM distance: >3 FB  Neck ROM: full     Cardiovascular   Rhythm: regular  Rate: normal     Dental    Pulmonary   Breath sounds clear to auscultation     Abdominal            Anesthesia Plan    History of general anesthesia?: yes  History of complications of general anesthesia?: no    ASA 2     general     intravenous induction   Anesthetic plan and risks discussed with patient.    Plan discussed with CRNA.

## 2024-08-28 NOTE — H&P
History Of Present Illness  Patient is a 59 y.o. male who presents with an increasingly symptomatic left inguinal hernia.  He does have intermittent episodes of pressure-like dull pain in the left groin.  His past surgical history is significant for a open right inguinal hernia repair and a laparoscopic inguinal hernia repair in [place], he is unsure which side that was performed on.  He denies any episodes of incarceration or strangulation, denies any overlying skin changes.  He denies any fevers, chills, nausea, vomiting.  Denies any issues with urination.  Denies any cardiac or respiratory issues, denies any other past abdominal surgeries.     Past Medical History  Medical History        Past Medical History:   Diagnosis Date    Fracture of nasal bones, initial encounter for closed fracture       Fractured nose    Personal history of (healed) traumatic fracture       History of fracture of finger    Personal history of other diseases of the digestive system       History of hemorrhoids    Unspecified injury of unspecified ankle, initial encounter       Ankle injury            Surgical History  Surgical History         Past Surgical History:   Procedure Laterality Date    HEMORRHOID SURGERY   06/27/2014     Hemorrhoidectomy    HERNIA REPAIR                Social History  He reports that he has never smoked. He has never used smokeless tobacco. He reports current alcohol use of about 3.0 standard drinks of alcohol per week. He reports that he does not use drugs.     Family History  Family History          Family History   Problem Relation Name Age of Onset    Stroke Mother        Leukemia Father                Allergies  Patient has no known allergies.     Review of Systems  - CONSTITUTIONAL: Denies fever and chills.  - HEENT: Denies changes in vision and hearing.  - RESPIRATORY: Denies SOB and cough.  - CV: Denies palpitations and CP.  - GI: Denies abdominal pain, nausea, vomiting and diarrhea.  - : Denies  dysuria and urinary frequency.  - MSK: Denies myalgia and joint pain.  - SKIN: Denies rash and pruritus.  - NEUROLOGICAL: Denies headache and syncope.  - PSYCHIATRIC: Denies recent changes in mood. Denies anxiety and depression.     Physical Exam  - GENERAL: Alert and oriented x 3. No acute distress. Well-nourished.  - EYES: EOMI. Anicteric.  - HENT: Moist mucous membranes. No scleral icterus. No cervical lymphadenopathy.  - LUNGS: Breathing comfortably on room air. No accessory muscle use, no distress.  - CARDIOVASCULAR: Regular rate and rhythm. No murmur. No JVD.  - ABDOMEN: Soft, non-tender and non-distended. No palpable masses.  Reducible small left inguinal hernia.  - EXTREMITIES: No edema. Non-tender.  - SKIN: No rashes or lesions. Warm.  - NEUROLOGIC: No focal neurological deficits. CN II-XII grossly intact, but not individually tested.  - PSYCHIATRIC: Cooperative. Appropriate mood and affect.     Last Recorded Vitals  Blood pressure 128/75, pulse 75, temperature 36.1 °C (97 °F), temperature source Temporal, weight 103 kg (227 lb 12.8 oz), SpO2 99%.     Relevant Results  CT abdomen pelvis wo IV contrast     Result Date: 6/29/2024  Interpreted By:  Jatinder Dickinson, STUDY: CT ABDOMEN PELVIS WO IV CONTRAST;  6/28/2024 10:05 am   INDICATION: Signs/Symptoms:left nephrolothiasis.   COMPARISON: Ultrasound 06/11/2024   ACCESSION NUMBER(S): [id number]   ORDERING CLINICIAN: HARJEET COVARRUBIAS   TECHNIQUE: CT of the abdomen and pelvis was performed. Contiguous axial images were obtained at 3 mm slice thickness through the abdomen and pelvis. Coronal and sagittal reconstructions at 3 mm slice thickness were performed.  No intravenous contrast was administered.   FINDINGS: Please note that the evaluation of vessels, lymph nodes and organs is limited without intravenous contrast.   LOWER CHEST: Mild patchy bibasilar infiltrates/atelectasis particularly anteriorly on the right. Partially imaged at least small pericardial  effusion.   ABDOMEN:   LIVER: Within normal limits.   BILE DUCTS: No significant biliary ductal dilatation.   GALLBLADDER: No calcified stones. No wall thickening.   PANCREAS: Within normal limits.   SPLEEN: Multiple vague hypoattenuating nodular foci throughout the spleen for example up to 9 mm image 42 and 13 mm image 59.   ADRENAL GLANDS: Mild low-density adrenal thickening/nodularity bilaterally likely due to adenomas and/or hyperplasia.   KIDNEYS AND URETERS: Approximate 1.1 cm focal calcification or stone midpole left renal cortex, correlates with finding from previous ultrasound. No hydronephrosis. No ureteral stones.   PELVIS:   BLADDER: Mild nonspecific urinary bladder wall thickening.   REPRODUCTIVE ORGANS: Enlarged prostate gland.   BOWEL: Stomach distended with ingested contents. Mildly distended fluid filled and fecalized small bowel loops throughout particularly mid to lower abdomen and pelvis with some scattered air-fluid levels. Prominent colonic stool. Appendix within normal limits. There is some colonic diverticulosis at the sigmoid and distal colon. Mild wall thickening versus incomplete distention at the distal colon. No definite acute inflammatory changes.   VESSELS: Mild to moderateatherosclerotic changes are noted involving the aorta and branching vessels. There is no aneurysmal dilatation. IVC appears normal in caliber.   PERITONEUM/RETROPERITONEUM/LYMPH NODES: No ascites or free air, no fluid collection.  1 cm nodule anterior to the spleen felt probably to represent a splenule. Mildly prominent nonspecific mesenteric nodes scattered throughout up to 7 mm short axis. Mildly prominent nonspecific inguinal nodes up to 11 mm short axis. Mildly prominent nonspecific retroperitoneal nodes up to 8 mm short axis.   ABDOMINAL WALL: Multiple densities just beneath the right anterolateral abdominal wall inferiorly probably relating to sequela of previous hernia repair. Small right and moderate left fat  containing inguinal hernias. Small fat containing umbilical hernia.   BONES: Bones appear demineralized. Mild anterolisthesis L5 on S1. Mild retrolisthesis L2 on L3 and L3 on L4. Focal irregular lucencies at the inferior endplates L2 through L4 measuring up to 1.4 cm at L2, may represent Schmorl's nodes. Marked arthritic changes left hip and moderate arthritic changes right hip. Multilevel degenerative changes visualized spine.At least moderate central canal stenosis L4-L5 and L5-S1.        1.  Approximate 1.1 cm focal calcification or stone midpole left renal cortex correlates with finding from recent ultrasound. No hydronephrosis or ureteral stones. 2. Enlarged prostate gland. Correlate with serum PSA. Nonspecific urinary bladder wall thickening. 3. Stomach distended with ingested contents. The possibility of partial or developing gastric outlet obstruction cannot be entirely excluded. There are also mildly distended fluid filled and fecalized small bowel loops scattered throughout could reflect nonspecific ileus/enteritis. Clinical correlation and follow-up advised. 4. Prominent colonic stool throughout. Mild diverticulosis with some associated wall thickening versus incomplete distention at the distal colon. No definite regional inflammatory change. Correlation with colonoscopy may be considered as a means of further assessment. 5. Multiple vague hypoattenuating foci throughout the spleen incompletely characterized. Follow-up warranted and consider MRI for further assessment. 6. Mildly prominent nonspecific abdominopelvic nodes as above. 7. Multiple densities just beneath the right anterolateral abdominal wall inferiorly probably relating to sequela of previous hernia repair. Small right and moderate left fat containing inguinal hernias. Small fat containing umbilical hernia. 8. Multilevel degenerative changes visualized spine. Irregular hypoattenuating foci at the inferior endplates L2 through L4 as described  probably representing Schmorl's nodes although other etiologies are not entirely excluded. Mild anterolisthesis L5 on S1 and retrolisthesis L2 on L3 and L3 on L4. At least moderate central canal stenosis L4-L5 and L5-S1. Correlation with MRI may be considered as a means of further assessment. 9. Additional findings as above.     MACRO: None   Signed by: Jatinder Dickinson 6/29/2024 10:59 AM Dictation workstation:   [id number]        Assessment and Plan  Patient is a 59 y.o. male who presents to discuss further workup and or management of a symptomatic left inguinal hernia.  The etiology, pathophysiology, natural course, various treatment options for inguinal hernias were explained to the patient.  He thinks his previous laparoscopic repair was on the right side for a recurrent right inguinal hernia repair after an open right inguinal hernia repair 30 years ago.  Therefore, we will plan for a laparoscopic left inguinal hernia repair.  Therefore the risk and benefits of surgery were explained.  The risks of bleeding, infection, nonresolution of symptoms, hernia recurrence, anesthesia complications were all explained to the patient he voiced understanding.      Rafi Faye MD

## 2024-08-28 NOTE — PERIOPERATIVE NURSING NOTE
1206 - Patient arrived to [place] after procedure with Anesthesia and procedure RN, procedure discussed, plan reviewed, VSS.     1215 - patient weaned off O2 at this time. OPA removed.    1225 - patient spouse updated via text messaging at this time.    1228 - patient tolerating PO fluids at this time.     1241 - patient wife updated via telephone at this time.     1254 - patient tolerating pudding at this time.     1256 - patient medicated per emr order at this time.     1308 - handoff report given to SAADIA Wallis RN

## 2024-08-28 NOTE — PERIOPERATIVE NURSING NOTE
1314 assuming care of pt at this time, pt meeting criteria for phase 1 dc. Pt provided with additional water and ginger ale due to need to void prior to dc.  1315 pt into phase 2 status at this time.   1323 Message sent to family via text at this time.   1333 pt assisted with dressing at this time with help of wife at bedside.   1345 pt ambulated to and from restroom, void successful. IV removed and dressing applied.  1355 Discharge instructions provided to pt and family. Educated on medications, effects of anesthesia, and homecoming care. Pt and family verbalizing understanding of all instructions provided at this time. All questions and concerns answered. Pt given contact information for provider. Pt awaiting transport to [facility].

## 2024-08-28 NOTE — OP NOTE
Laparoscopic Left Inguinal Hernia Repair (L) Operative Note     Date: 2024  OR Location: [facility] A OR    Name: Steven Brice, : 1965, Age: 59 y.o., MRN: [id number], Sex: male    Diagnosis  Pre-op Diagnosis      * Inguinal hernia of left side without obstruction or gangrene [K40.90] Post-op Diagnosis     * Inguinal hernia of left side without obstruction or gangrene [K40.90]     Procedures  Laparoscopic Left Inguinal Hernia Repair    Surgeons      * Rafi Faye - Primary    Resident/Fellow/Other Assistant:  Surgeons and Role:  * No surgeons found with a matching role *    Procedure Summary  Anesthesia: General  ASA: II  Anesthesia Staff: Anesthesiologist: Silvio Almonte MD; Patrick Haas MD  CRNA: KEYANNA Akbar-MATT, TRICIA  Estimated Blood Loss: 5mL  Intra-op Medications:   Administrations occurring from 1045 to 1215 on 24:   Medication Name Total Dose   BUPivacaine-EPINEPHrine (Marcaine w/EPI) 0.5 %-1:200,000 injection 10 mL   sodium chloride 0.9 % irrigation solution 1,000 mL   lactated Ringer's infusion 18.67 mL              Anesthesia Record               Intraprocedure I/O Totals          Intake    lactated Ringer's infusion 1500.00 mL    Total Intake 1500 mL       Output    Urine 300 mL    Total Output 300 mL       Net    Net Volume 1200 mL          Specimen: No specimens collected     Staff:   Circulator: Kellee  Scrub Person: Pippa  Scrub Person: Janet Looney Scrub: Marleena  Relief Circulator: Zandra  Relief Scrub: Marielena         Drains and/or Catheters:   [REMOVED] Urethral Catheter Double-lumen 16 Fr. (Removed)       Tourniquet Times:         Implants:  Implants       Type Name Action Serial No.      Surgical Mesh Sling Implant MESH, 3DMAX MID, 4 X 6 IN, LARGE LEFT - [id number] Implanted               Findings: small direct inguinal hernia, moderate indirect inguinal hernia    Indications: Steven Brice is an 59 y.o. male who is having surgery for Inguinal hernia of  left side without obstruction or gangrene [K40.90].     The patient was seen in the preoperative area. The risks, benefits, complications, treatment options, non-operative alternatives, expected recovery and outcomes were discussed with the patient. The possibilities of reaction to medication, pulmonary aspiration, injury to surrounding structures, bleeding, recurrent infection, the need for additional procedures, failure to diagnose a condition, and creating a complication requiring transfusion or operation were discussed with the patient. The patient concurred with the proposed plan, giving informed consent.  The site of surgery was properly noted/marked if necessary per policy. The patient has been actively warmed in preoperative area. Preoperative antibiotics have been ordered and given within 2 hours of incision. Venous thrombosis prophylaxis have been ordered including bilateral sequential compression devices    Procedure Details:   PREOPERATIVE DIAGNOSIS: left inguinal hernia    POSTOPERATIVE DIAGNOSIS: same    PROCEDURE PERFORMED:  Laparoscopic left inguinal hernia repair    SURGEON: Dr. Rafi Faye    ANESTHESIA: GETPRABHJOT     ESTIMATED BLOOD LOSS: 1ml    SPECIMEN: None    COMPLICATIONS: None.    BRIEF HISTORY: This is a 59 y.o. year old male who initially presented with signs and symptoms consistent with a left inguinal hernia. History, physical, labs, and imaging were all consistent with a left inguinal hernia. The decision was therefore made to take to the patient to the operating room for a left inguinal hernia repair. The risks, benefits, alternatives, and complications of the procedure were explained to the patient and he signed informed consent to proceed.    OPERATIVE NOTE: Preoperative antibiotics were given and documented. After a surgical timeout, the patient was prepped and draped in the usual sterile fashion with chlorhexidine.  A transverse incision was made infraumbilically just left lateral to  the midline.  The subcutaneous tissues were divided and the anterior rectus sheath was incised with a scalpel.  The balloon dissector was then placed in the preperitoneal space and insufflated and removed.  A balloon trocar was then placed in the preperitoneal space and insufflated.  Two 5 mm trocars were then placed in the midline.  The preperitoneal space was dissected laterally to the left and the cord and associated hernia sac were encountered.  The hernia was reduced along with a small cord lipoma.  The hernia sac was then  from the cord structures taking care to protect the cord structures.  The cord structures were then peritonealized to allow good placement of the mesh.  A left 3D max mid large mesh was then placed into the preperitoneal space and positioned appropriately.  The mesh laid well and cover the entire myopectineal orifice.  This was then tacked in place using the absorbatack above the pubic tubercle inferiorly, and superomedially and superolaterally.  The preperitoneal space was then desufflated and trocars subsequently removed.  The fascia of the balloon trocar was then closed with a single 0 Vicryl suture.  Skin was closed with a 4-0 monocryl suture and local anesthetic was injected to all laparoscopic sites. The patient tolerated the procedure well, there were no immediate complications, and the patient was taken to PACU in stable condition.       Complications:  None; patient tolerated the procedure well.    Disposition: PACU - hemodynamically stable.  Condition: stable     Additional Details:     Attending Attestation: I was present and scrubbed for the entire procedure.    Rafi Faye  Phone Number: [phone]

## 2024-08-30 ENCOUNTER — TELEPHONE (OUTPATIENT)
Dept: UROLOGY | Facility: HOSPITAL | Age: 59
End: 2024-08-30
Payer: COMMERCIAL

## 2024-08-30 NOTE — TELEPHONE ENCOUNTER
Spoke with patient; he was referred to Dr. Fredy Diallo for the procedure he is seeking.  Dr. Diallo's nurse given patient information and patient given his nurse phone number.    Darshana Estrada LPN

## 2024-08-30 NOTE — TELEPHONE ENCOUNTER
Called patient at [phone] to schedule his Mr guided prostate biopsy with Dr. Fredy Dialol. Patient is set for 9/24/24.

## 2024-08-30 NOTE — TELEPHONE ENCOUNTER
Left pt message with information about his surgery post op appointment with Dr. Diallo.    Gave direct call back number of [phone] to call back in case appointment doesn't work for his schedule

## 2024-09-03 ENCOUNTER — HOSPITAL ENCOUNTER (OUTPATIENT)
Facility: HOSPITAL | Age: 59
Setting detail: OUTPATIENT SURGERY
End: 2024-09-03
Attending: STUDENT IN AN ORGANIZED HEALTH CARE EDUCATION/TRAINING PROGRAM | Admitting: STUDENT IN AN ORGANIZED HEALTH CARE EDUCATION/TRAINING PROGRAM
Payer: COMMERCIAL

## 2024-09-03 ENCOUNTER — PREP FOR PROCEDURE (OUTPATIENT)
Dept: UROLOGY | Facility: HOSPITAL | Age: 59
End: 2024-09-03
Payer: COMMERCIAL

## 2024-09-03 DIAGNOSIS — R97.20 ELEVATED PSA: ICD-10-CM

## 2024-09-03 RX ORDER — SODIUM CHLORIDE, SODIUM LACTATE, POTASSIUM CHLORIDE, CALCIUM CHLORIDE 600; 310; 30; 20 MG/100ML; MG/100ML; MG/100ML; MG/100ML
20 INJECTION, SOLUTION INTRAVENOUS CONTINUOUS
OUTPATIENT
Start: 2024-09-03

## 2024-09-10 ENCOUNTER — OFFICE VISIT (OUTPATIENT)
Dept: SURGERY | Facility: CLINIC | Age: 59
End: 2024-09-10
Payer: COMMERCIAL

## 2024-09-10 VITALS
WEIGHT: 227 LBS | HEIGHT: 77 IN | SYSTOLIC BLOOD PRESSURE: 112 MMHG | TEMPERATURE: 97.7 F | BODY MASS INDEX: 26.8 KG/M2 | OXYGEN SATURATION: 99 % | DIASTOLIC BLOOD PRESSURE: 72 MMHG | HEART RATE: 64 BPM

## 2024-09-10 DIAGNOSIS — K40.90 UNILATERAL INGUINAL HERNIA WITHOUT OBSTRUCTION OR GANGRENE, RECURRENCE NOT SPECIFIED: Primary | ICD-10-CM

## 2024-09-10 PROCEDURE — 1036F TOBACCO NON-USER: CPT | Performed by: STUDENT IN AN ORGANIZED HEALTH CARE EDUCATION/TRAINING PROGRAM

## 2024-09-10 PROCEDURE — 3008F BODY MASS INDEX DOCD: CPT | Performed by: STUDENT IN AN ORGANIZED HEALTH CARE EDUCATION/TRAINING PROGRAM

## 2024-09-10 PROCEDURE — 99211 OFF/OP EST MAY X REQ PHY/QHP: CPT | Performed by: STUDENT IN AN ORGANIZED HEALTH CARE EDUCATION/TRAINING PROGRAM

## 2024-09-10 ASSESSMENT — PAIN SCALES - GENERAL: PAINLEVEL: 0-NO PAIN

## 2024-09-10 NOTE — PROGRESS NOTES
History Of Present Illness  Patient is a 59 y.o. male who presents for postoperative follow up after laparoscopic left inguinal hernia repair on 8/28. He has recovered well in the postoperative period without any unexpected complications. He denies fever, chills, nausea, vomiting, chest pain, and shortness of breath. He denies any issues with incisions. He denies any bruising or swelling in the left groin. He will resume light physical activity as tolerated.      Past Medical History  Past Medical History:   Diagnosis Date    Fracture of nasal bones, initial encounter for closed fracture     Fractured nose    GERD (gastroesophageal reflux disease)     Gout     Mitral valve disease     OA (osteoarthritis)     Personal history of (healed) traumatic fracture     History of fracture of finger    Personal history of other diseases of the digestive system     History of hemorrhoids    Unspecified injury of unspecified ankle, initial encounter     Ankle injury    Vertigo        Surgical History  Past Surgical History:   Procedure Laterality Date    HEMORRHOID SURGERY  06/27/2014    Hemorrhoidectomy    HERNIA REPAIR      x 2 -- 1987 and 2010 per pt        Social History  He reports that he has never smoked. He has never been exposed to tobacco smoke. He has never used smokeless tobacco. He reports current alcohol use of about 15.0 standard drinks of alcohol per week. He reports current drug use. Frequency: 1.00 time per week. Drug: Other.    Family History  Family History   Problem Relation Name Age of Onset    Stroke Mother      Leukemia Father          Allergies  Patient has no known allergies.    Review of Systems  - CONSTITUTIONAL: Denies fever and chills.  - HEENT: Denies changes in vision and hearing.  - RESPIRATORY: Denies SOB and cough.  - CV: Denies palpitations and CP.  - GI: Denies abdominal pain, nausea, vomiting and diarrhea.  - : Denies dysuria and urinary frequency.  - MSK: Denies myalgia and joint pain.  -  "SKIN: Denies rash and pruritus.  - NEUROLOGICAL: Denies headache and syncope.  - PSYCHIATRIC: Denies recent changes in mood. Denies anxiety and depression.     Physical Exam  - GENERAL: Alert and oriented x 3. No acute distress. Well-nourished.  - EYES: EOMI. Anicteric.  - HENT: Moist mucous membranes. No scleral icterus. No cervical lymphadenopathy.  - LUNGS: Breathing comfortably on room air. No accessory muscle use, no distress.  - CARDIOVASCULAR: Regular rate and rhythm. No murmur. No JVD.  - ABDOMEN: Soft, non-tender and non-distended. No palpable masses. Midline incisions with skin glue, no surrounding erythema or induration or palpable fluid collection. Mild ecchymoses surrounding the incisions. Skin glue intact. No evidence of hernia recurrence, no overlying skin changes.   - EXTREMITIES: No edema. Non-tender.  - SKIN: No rashes or lesions. Warm.  - NEUROLOGIC: No focal neurological deficits. CN II-XII grossly intact, but not individually tested.  - PSYCHIATRIC: Cooperative. Appropriate mood and affect.    Last Recorded Vitals  Blood pressure 112/72, pulse 64, temperature 36.5 °C (97.7 °F), height 1.956 m (6' 5\"), weight 103 kg (227 lb), SpO2 99%.    Relevant Results  XR cervical spine complete 4-5 views    Result Date: 8/15/2024  Interpreted By:  Margoth Preston, STUDY: XR CERVICAL SPINE COMPLETE 4-5 VIEWS; ;  8/13/2024 11:24 am   INDICATION: Signs/Symptoms:Neck Pain.   COMPARISON: None.   ACCESSION NUMBER(S): [id number]   ORDERING CLINICIAN: RUBY HAYS   FINDINGS: Reversal normal cervical lordosis is seen.   Osteopenia is present. The prevertebral soft tissues are unremarkable.   Endplate sclerosis and spur formation is seen throughout the cervical spine. Narrowing of C5-6 C6-7 C7-T1 disc space is seen no significant change in alignment is noted with flexion or extension Uncovertebral joint hypertrophy seen. No acute fracture or dislocation is seen.       Reversal normal cervical lordosis and " degenerative changes.     MACRO: None   Signed by: Margoth Preston 8/15/2024 8:39 PM Dictation workstation:   [id number]       Assessment and Plan  Patient is a 59 y.o. male who presents for a postoperative visit following a laparoscopic left inguinal hernia repair. He has recovered well in the follow up period without any unexpected complications at this time. He was informed that he can resume light physical activity and increase intensity as tolerated. He was informed to avoid pools/ bathing for another week to allow optimal healing of incisions. His questions were answered at this time and he was informed to follow up as needed.    DREAD APPLE     Seen and agree with above. Doing well s/p LIH repair, resume usual activities, follow up FORRESTN  Rafi Faye MD

## 2024-09-18 ENCOUNTER — HOSPITAL ENCOUNTER (OUTPATIENT)
Facility: HOSPITAL | Age: 59
Setting detail: OBSERVATION
Discharge: HOME | End: 2024-09-19
Attending: EMERGENCY MEDICINE | Admitting: STUDENT IN AN ORGANIZED HEALTH CARE EDUCATION/TRAINING PROGRAM
Payer: COMMERCIAL

## 2024-09-18 ENCOUNTER — APPOINTMENT (OUTPATIENT)
Dept: CARDIOLOGY | Facility: HOSPITAL | Age: 59
End: 2024-09-18
Payer: COMMERCIAL

## 2024-09-18 ENCOUNTER — APPOINTMENT (OUTPATIENT)
Dept: RADIOLOGY | Facility: HOSPITAL | Age: 59
End: 2024-09-18
Payer: COMMERCIAL

## 2024-09-18 DIAGNOSIS — I48.19 PERSISTENT ATRIAL FIBRILLATION (MULTI): ICD-10-CM

## 2024-09-18 DIAGNOSIS — I48.0 PAROXYSMAL ATRIAL FIBRILLATION (MULTI): ICD-10-CM

## 2024-09-18 DIAGNOSIS — I48.91 ATRIAL FIBRILLATION, UNSPECIFIED TYPE (MULTI): Primary | ICD-10-CM

## 2024-09-18 LAB
ALBUMIN SERPL BCP-MCNC: 4.3 G/DL (ref 3.4–5)
ALP SERPL-CCNC: 53 U/L (ref 33–136)
ALT SERPL W P-5'-P-CCNC: 19 U/L (ref 10–52)
ANION GAP SERPL CALC-SCNC: 14 MMOL/L (ref 10–20)
AST SERPL W P-5'-P-CCNC: 17 U/L (ref 9–39)
BASOPHILS # BLD AUTO: 0.04 X10*3/UL (ref 0–0.1)
BASOPHILS NFR BLD AUTO: 0.7 %
BILIRUB SERPL-MCNC: 0.6 MG/DL (ref 0–1.2)
BUN SERPL-MCNC: 17 MG/DL (ref 6–23)
CALCIUM SERPL-MCNC: 8.9 MG/DL (ref 8.6–10.3)
CARDIAC TROPONIN I PNL SERPL HS: 3 NG/L (ref 0–20)
CARDIAC TROPONIN I PNL SERPL HS: 4 NG/L (ref 0–20)
CHLORIDE SERPL-SCNC: 107 MMOL/L (ref 98–107)
CO2 SERPL-SCNC: 22 MMOL/L (ref 21–32)
CREAT SERPL-MCNC: 0.97 MG/DL (ref 0.5–1.3)
D DIMER PPP FEU-MCNC: 431 NG/ML FEU
EGFRCR SERPLBLD CKD-EPI 2021: 89 ML/MIN/1.73M*2
EOSINOPHIL # BLD AUTO: 0.15 X10*3/UL (ref 0–0.7)
EOSINOPHIL NFR BLD AUTO: 2.6 %
ERYTHROCYTE [DISTWIDTH] IN BLOOD BY AUTOMATED COUNT: 12.6 % (ref 11.5–14.5)
GLUCOSE SERPL-MCNC: 100 MG/DL (ref 74–99)
HCT VFR BLD AUTO: 44 % (ref 41–52)
HGB BLD-MCNC: 15 G/DL (ref 13.5–17.5)
IMM GRANULOCYTES # BLD AUTO: 0.02 X10*3/UL (ref 0–0.7)
IMM GRANULOCYTES NFR BLD AUTO: 0.3 % (ref 0–0.9)
LYMPHOCYTES # BLD AUTO: 1.63 X10*3/UL (ref 1.2–4.8)
LYMPHOCYTES NFR BLD AUTO: 27.7 %
MCH RBC QN AUTO: 30.9 PG (ref 26–34)
MCHC RBC AUTO-ENTMCNC: 34.1 G/DL (ref 32–36)
MCV RBC AUTO: 91 FL (ref 80–100)
MONOCYTES # BLD AUTO: 0.52 X10*3/UL (ref 0.1–1)
MONOCYTES NFR BLD AUTO: 8.8 %
NEUTROPHILS # BLD AUTO: 3.52 X10*3/UL (ref 1.2–7.7)
NEUTROPHILS NFR BLD AUTO: 59.9 %
NRBC BLD-RTO: 0 /100 WBCS (ref 0–0)
PLATELET # BLD AUTO: 192 X10*3/UL (ref 150–450)
POTASSIUM SERPL-SCNC: 4 MMOL/L (ref 3.5–5.3)
PROT SERPL-MCNC: 6.8 G/DL (ref 6.4–8.2)
RBC # BLD AUTO: 4.85 X10*6/UL (ref 4.5–5.9)
SODIUM SERPL-SCNC: 139 MMOL/L (ref 136–145)
TSH SERPL-ACNC: 1.26 MIU/L (ref 0.44–3.98)
WBC # BLD AUTO: 5.9 X10*3/UL (ref 4.4–11.3)

## 2024-09-18 PROCEDURE — 93005 ELECTROCARDIOGRAM TRACING: CPT

## 2024-09-18 PROCEDURE — 84484 ASSAY OF TROPONIN QUANT: CPT | Performed by: EMERGENCY MEDICINE

## 2024-09-18 PROCEDURE — 84443 ASSAY THYROID STIM HORMONE: CPT

## 2024-09-18 PROCEDURE — 80053 COMPREHEN METABOLIC PANEL: CPT | Performed by: EMERGENCY MEDICINE

## 2024-09-18 PROCEDURE — 71046 X-RAY EXAM CHEST 2 VIEWS: CPT

## 2024-09-18 PROCEDURE — 36415 COLL VENOUS BLD VENIPUNCTURE: CPT | Performed by: EMERGENCY MEDICINE

## 2024-09-18 PROCEDURE — 85379 FIBRIN DEGRADATION QUANT: CPT | Performed by: EMERGENCY MEDICINE

## 2024-09-18 PROCEDURE — 2500000004 HC RX 250 GENERAL PHARMACY W/ HCPCS (ALT 636 FOR OP/ED): Performed by: EMERGENCY MEDICINE

## 2024-09-18 PROCEDURE — 71046 X-RAY EXAM CHEST 2 VIEWS: CPT | Mod: FOREIGN READ | Performed by: RADIOLOGY

## 2024-09-18 PROCEDURE — G0378 HOSPITAL OBSERVATION PER HR: HCPCS

## 2024-09-18 PROCEDURE — 96376 TX/PRO/DX INJ SAME DRUG ADON: CPT

## 2024-09-18 PROCEDURE — 2500000001 HC RX 250 WO HCPCS SELF ADMINISTERED DRUGS (ALT 637 FOR MEDICARE OP)

## 2024-09-18 PROCEDURE — 85025 COMPLETE CBC W/AUTO DIFF WBC: CPT | Performed by: EMERGENCY MEDICINE

## 2024-09-18 PROCEDURE — 99285 EMERGENCY DEPT VISIT HI MDM: CPT

## 2024-09-18 RX ORDER — POLYETHYLENE GLYCOL 3350 17 G/17G
17 POWDER, FOR SOLUTION ORAL DAILY
Status: DISCONTINUED | OUTPATIENT
Start: 2024-09-19 | End: 2024-09-19 | Stop reason: HOSPADM

## 2024-09-18 RX ORDER — PANTOPRAZOLE SODIUM 40 MG/1
40 TABLET, DELAYED RELEASE ORAL
Status: DISCONTINUED | OUTPATIENT
Start: 2024-09-19 | End: 2024-09-19

## 2024-09-18 RX ORDER — DOCUSATE SODIUM 100 MG/1
100 CAPSULE, LIQUID FILLED ORAL 2 TIMES DAILY
Status: DISCONTINUED | OUTPATIENT
Start: 2024-09-18 | End: 2024-09-19 | Stop reason: HOSPADM

## 2024-09-18 RX ORDER — HEPARIN SODIUM 10000 [USP'U]/100ML
0-4500 INJECTION, SOLUTION INTRAVENOUS CONTINUOUS
Status: DISCONTINUED | OUTPATIENT
Start: 2024-09-18 | End: 2024-09-19

## 2024-09-18 RX ORDER — TAMSULOSIN HYDROCHLORIDE 0.4 MG/1
0.4 CAPSULE ORAL DAILY
Status: DISCONTINUED | OUTPATIENT
Start: 2024-09-19 | End: 2024-09-19 | Stop reason: HOSPADM

## 2024-09-18 RX ORDER — PANTOPRAZOLE SODIUM 40 MG/10ML
40 INJECTION, POWDER, LYOPHILIZED, FOR SOLUTION INTRAVENOUS
Status: DISCONTINUED | OUTPATIENT
Start: 2024-09-19 | End: 2024-09-19

## 2024-09-18 SDOH — SOCIAL STABILITY: SOCIAL INSECURITY: ABUSE: ADULT

## 2024-09-18 SDOH — SOCIAL STABILITY: SOCIAL INSECURITY: ARE YOU OR HAVE YOU BEEN THREATENED OR ABUSED PHYSICALLY, EMOTIONALLY, OR SEXUALLY BY ANYONE?: NO

## 2024-09-18 SDOH — SOCIAL STABILITY: SOCIAL INSECURITY: DO YOU FEEL UNSAFE GOING BACK TO THE PLACE WHERE YOU ARE LIVING?: NO

## 2024-09-18 SDOH — SOCIAL STABILITY: SOCIAL INSECURITY: HAVE YOU HAD ANY THOUGHTS OF HARMING ANYONE ELSE?: NO

## 2024-09-18 SDOH — SOCIAL STABILITY: SOCIAL INSECURITY: ARE THERE ANY APPARENT SIGNS OF INJURIES/BEHAVIORS THAT COULD BE RELATED TO ABUSE/NEGLECT?: NO

## 2024-09-18 SDOH — SOCIAL STABILITY: SOCIAL INSECURITY: HAVE YOU HAD THOUGHTS OF HARMING ANYONE ELSE?: NO

## 2024-09-18 SDOH — SOCIAL STABILITY: SOCIAL INSECURITY: DO YOU FEEL ANYONE HAS EXPLOITED OR TAKEN ADVANTAGE OF YOU FINANCIALLY OR OF YOUR PERSONAL PROPERTY?: NO

## 2024-09-18 SDOH — SOCIAL STABILITY: SOCIAL INSECURITY: HAS ANYONE EVER THREATENED TO HURT YOUR FAMILY OR YOUR PETS?: NO

## 2024-09-18 SDOH — SOCIAL STABILITY: SOCIAL INSECURITY: DOES ANYONE TRY TO KEEP YOU FROM HAVING/CONTACTING OTHER FRIENDS OR DOING THINGS OUTSIDE YOUR HOME?: NO

## 2024-09-18 ASSESSMENT — COGNITIVE AND FUNCTIONAL STATUS - GENERAL
DAILY ACTIVITIY SCORE: 24
MOBILITY SCORE: 24
PATIENT BASELINE BEDBOUND: NO

## 2024-09-18 ASSESSMENT — ACTIVITIES OF DAILY LIVING (ADL)
DRESSING YOURSELF: INDEPENDENT
ADEQUATE_TO_COMPLETE_ADL: YES
HEARING - LEFT EAR: FUNCTIONAL
WALKS IN HOME: INDEPENDENT
GROOMING: INDEPENDENT
HEARING - RIGHT EAR: FUNCTIONAL
TOILETING: INDEPENDENT
LACK_OF_TRANSPORTATION: NO
PATIENT'S MEMORY ADEQUATE TO SAFELY COMPLETE DAILY ACTIVITIES?: YES
JUDGMENT_ADEQUATE_SAFELY_COMPLETE_DAILY_ACTIVITIES: YES
FEEDING YOURSELF: INDEPENDENT
BATHING: INDEPENDENT

## 2024-09-18 ASSESSMENT — LIFESTYLE VARIABLES
SKIP TO QUESTIONS 9-10: 1
HOW OFTEN DO YOU HAVE 6 OR MORE DRINKS ON ONE OCCASION: NEVER
AUDIT-C TOTAL SCORE: 2
SUBSTANCE_ABUSE_PAST_12_MONTHS: YES
PRESCIPTION_ABUSE_PAST_12_MONTHS: NO
AUDIT-C TOTAL SCORE: 2
HOW OFTEN DO YOU HAVE A DRINK CONTAINING ALCOHOL: 2-4 TIMES A MONTH
HOW MANY STANDARD DRINKS CONTAINING ALCOHOL DO YOU HAVE ON A TYPICAL DAY: 1 OR 2

## 2024-09-18 ASSESSMENT — PAIN SCALES - GENERAL
PAINLEVEL_OUTOF10: 0 - NO PAIN

## 2024-09-18 ASSESSMENT — ENCOUNTER SYMPTOMS
PALPITATIONS: 1
HEMATOLOGIC/LYMPHATIC NEGATIVE: 1
GASTROINTESTINAL NEGATIVE: 1
SHORTNESS OF BREATH: 1
EYES NEGATIVE: 1
MUSCULOSKELETAL NEGATIVE: 1
ALLERGIC/IMMUNOLOGIC NEGATIVE: 1
LIGHT-HEADEDNESS: 1
PSYCHIATRIC NEGATIVE: 1
ENDOCRINE NEGATIVE: 1
CONSTITUTIONAL NEGATIVE: 1

## 2024-09-18 ASSESSMENT — PATIENT HEALTH QUESTIONNAIRE - PHQ9
1. LITTLE INTEREST OR PLEASURE IN DOING THINGS: NOT AT ALL
SUM OF ALL RESPONSES TO PHQ9 QUESTIONS 1 & 2: 0
2. FEELING DOWN, DEPRESSED OR HOPELESS: NOT AT ALL

## 2024-09-18 ASSESSMENT — PAIN - FUNCTIONAL ASSESSMENT
PAIN_FUNCTIONAL_ASSESSMENT: 0-10
PAIN_FUNCTIONAL_ASSESSMENT: 0-10

## 2024-09-18 ASSESSMENT — VISUAL ACUITY: OU: 1

## 2024-09-18 NOTE — ED TRIAGE NOTES
TRIAGE NOTE   I saw the patient as the Clinician in Triage and performed a brief history and physical exam, established acuity, and ordered appropriate tests to develop basic plan of care. Patient will be seen by an SHERRY, resident and/or physician who will independently evaluate the patient. Please see subsequent provider notes for further details and disposition.     Brief HPI: In brief, Steven Brice is a 59 y.o. male that presents for feeling weak tired short of breath.  59-year-old male, no past cardiac history, had what sounds like inguinal hernia repair several weeks ago.  Started back exercising and back to work within the last several days but is felt increasingly short of breath with exertion, lightheaded dizzy and irregular or at least oddly feeling pulse rate when he tries to check it at home on his exercise equipment.  Mild again lightheadedness dizziness dyspneic feeling.  Went to work today, they thought his heart rate was irregular and deferred him to the ED.  Focused Physical exam:   Vital signs stable.  Adequate oxygenation.  Appears nontoxic.  Heart is irregularly irregular but not markedly tachycardic.  Lungs anteriorly are clear no overt signs of failure.  Legs are nontender.  No gross asymmetry or tenderness along the venous return system.  Plan/MDM:   Patient with what sounds like new onset atrial fibrillation.  Not rapid ventricular response currently, but will need evaluation for new onset A-fib at this time.  Basic labs, imaging initiated.    Please see subsequent provider note for further details and disposition

## 2024-09-18 NOTE — ED TRIAGE NOTES
Pt here with irregular HB since Sunday; pt reports that he had hernia surgery 3wks ago and has felt 100% since; pt reports that palpitations are worse with ambulation;  +diaphoretic, able to ambulate to triage room with steady gait independently     Denies heart hx/diabetes/nvd/clots/fevers

## 2024-09-19 ENCOUNTER — APPOINTMENT (OUTPATIENT)
Dept: CARDIOLOGY | Facility: HOSPITAL | Age: 59
End: 2024-09-19
Payer: COMMERCIAL

## 2024-09-19 ENCOUNTER — TELEMEDICINE (OUTPATIENT)
Dept: CARDIOLOGY | Facility: HOSPITAL | Age: 59
End: 2024-09-19
Payer: COMMERCIAL

## 2024-09-19 ENCOUNTER — ANESTHESIA (OUTPATIENT)
Dept: CARDIOLOGY | Facility: HOSPITAL | Age: 59
End: 2024-09-19
Payer: COMMERCIAL

## 2024-09-19 ENCOUNTER — ANESTHESIA EVENT (OUTPATIENT)
Dept: CARDIOLOGY | Facility: HOSPITAL | Age: 59
End: 2024-09-19
Payer: COMMERCIAL

## 2024-09-19 VITALS
TEMPERATURE: 98.1 F | HEIGHT: 77 IN | BODY MASS INDEX: 26.8 KG/M2 | HEART RATE: 63 BPM | RESPIRATION RATE: 17 BRPM | OXYGEN SATURATION: 99 % | WEIGHT: 227 LBS | DIASTOLIC BLOOD PRESSURE: 82 MMHG | SYSTOLIC BLOOD PRESSURE: 122 MMHG

## 2024-09-19 DIAGNOSIS — I48.0 PAROXYSMAL ATRIAL FIBRILLATION (MULTI): Primary | ICD-10-CM

## 2024-09-19 LAB
ALBUMIN SERPL BCP-MCNC: 3.5 G/DL (ref 3.4–5)
ALP SERPL-CCNC: 42 U/L (ref 33–136)
ALT SERPL W P-5'-P-CCNC: 15 U/L (ref 10–52)
ANION GAP SERPL CALC-SCNC: 11 MMOL/L (ref 10–20)
AST SERPL W P-5'-P-CCNC: 15 U/L (ref 9–39)
ATRIAL RATE: 326 BPM
ATRIAL RATE: 45 BPM
BILIRUB SERPL-MCNC: 0.7 MG/DL (ref 0–1.2)
BUN SERPL-MCNC: 17 MG/DL (ref 6–23)
CALCIUM SERPL-MCNC: 8.4 MG/DL (ref 8.6–10.3)
CHLORIDE SERPL-SCNC: 107 MMOL/L (ref 98–107)
CHOLEST SERPL-MCNC: 185 MG/DL (ref 0–199)
CHOLESTEROL/HDL RATIO: 4
CO2 SERPL-SCNC: 24 MMOL/L (ref 21–32)
CREAT SERPL-MCNC: 1.01 MG/DL (ref 0.5–1.3)
EGFRCR SERPLBLD CKD-EPI 2021: 85 ML/MIN/1.73M*2
EJECTION FRACTION APICAL 4 CHAMBER: 51.3
EJECTION FRACTION: 48 %
EJECTION FRACTION: 49 %
ERYTHROCYTE [DISTWIDTH] IN BLOOD BY AUTOMATED COUNT: 12.4 % (ref 11.5–14.5)
EST. AVERAGE GLUCOSE BLD GHB EST-MCNC: 100 MG/DL
GLUCOSE SERPL-MCNC: 103 MG/DL (ref 74–99)
HBA1C MFR BLD: 5.1 %
HCT VFR BLD AUTO: 40.5 % (ref 41–52)
HDLC SERPL-MCNC: 46.4 MG/DL
HGB BLD-MCNC: 14.2 G/DL (ref 13.5–17.5)
HOLD SPECIMEN: NORMAL
HOLD SPECIMEN: NORMAL
LDLC SERPL CALC-MCNC: 119 MG/DL
LEFT VENTRICLE INTERNAL DIMENSION DIASTOLE: 5.11 CM (ref 3.5–6)
MAGNESIUM SERPL-MCNC: 2.1 MG/DL (ref 1.6–2.4)
MCH RBC QN AUTO: 31.6 PG (ref 26–34)
MCHC RBC AUTO-ENTMCNC: 35.1 G/DL (ref 32–36)
MCV RBC AUTO: 90 FL (ref 80–100)
NON HDL CHOLESTEROL: 139 MG/DL (ref 0–149)
NRBC BLD-RTO: 0 /100 WBCS (ref 0–0)
P AXIS: 24 DEGREES
P OFFSET: 210 MS
P ONSET: 149 MS
PLATELET # BLD AUTO: 183 X10*3/UL (ref 150–450)
POTASSIUM SERPL-SCNC: 3.8 MMOL/L (ref 3.5–5.3)
PR INTERVAL: 150 MS
PROT SERPL-MCNC: 5.4 G/DL (ref 6.4–8.2)
Q ONSET: 224 MS
Q ONSET: 226 MS
QRS COUNT: 7 BEATS
QRS COUNT: 9 BEATS
QRS DURATION: 86 MS
QRS DURATION: 90 MS
QT INTERVAL: 402 MS
QT INTERVAL: 422 MS
QTC CALCULATION(BAZETT): 365 MS
QTC CALCULATION(BAZETT): 373 MS
QTC FREDERICIA: 383 MS
QTC FREDERICIA: 383 MS
R AXIS: -22 DEGREES
R AXIS: -23 DEGREES
RBC # BLD AUTO: 4.5 X10*6/UL (ref 4.5–5.9)
RIGHT VENTRICLE FREE WALL PEAK S': 11.7 CM/S
RIGHT VENTRICLE PEAK SYSTOLIC PRESSURE: 19.2 MMHG
SODIUM SERPL-SCNC: 138 MMOL/L (ref 136–145)
T AXIS: 14 DEGREES
T AXIS: 33 DEGREES
T OFFSET: 427 MS
T OFFSET: 435 MS
TRICUSPID ANNULAR PLANE SYSTOLIC EXCURSION: 1.9 CM
TRIGL SERPL-MCNC: 98 MG/DL (ref 0–149)
UFH PPP CHRO-ACNC: 0.5 IU/ML
UFH PPP CHRO-ACNC: 0.7 IU/ML
UFH PPP CHRO-ACNC: 0.9 IU/ML
VENTRICULAR RATE: 45 BPM
VENTRICULAR RATE: 52 BPM
VLDL: 20 MG/DL (ref 0–40)
WBC # BLD AUTO: 5.1 X10*3/UL (ref 4.4–11.3)

## 2024-09-19 PROCEDURE — 99239 HOSP IP/OBS DSCHRG MGMT >30: CPT | Performed by: STUDENT IN AN ORGANIZED HEALTH CARE EDUCATION/TRAINING PROGRAM

## 2024-09-19 PROCEDURE — 99222 1ST HOSP IP/OBS MODERATE 55: CPT | Performed by: INTERNAL MEDICINE

## 2024-09-19 PROCEDURE — 99204 OFFICE O/P NEW MOD 45 MIN: CPT | Performed by: INTERNAL MEDICINE

## 2024-09-19 PROCEDURE — G0378 HOSPITAL OBSERVATION PER HR: HCPCS

## 2024-09-19 PROCEDURE — 92960 CARDIOVERSION ELECTRIC EXT: CPT | Performed by: INTERNAL MEDICINE

## 2024-09-19 PROCEDURE — 93005 ELECTROCARDIOGRAM TRACING: CPT | Mod: 59

## 2024-09-19 PROCEDURE — 2500000001 HC RX 250 WO HCPCS SELF ADMINISTERED DRUGS (ALT 637 FOR MEDICARE OP): Performed by: INTERNAL MEDICINE

## 2024-09-19 PROCEDURE — 2500000004 HC RX 250 GENERAL PHARMACY W/ HCPCS (ALT 636 FOR OP/ED): Performed by: STUDENT IN AN ORGANIZED HEALTH CARE EDUCATION/TRAINING PROGRAM

## 2024-09-19 PROCEDURE — 2500000004 HC RX 250 GENERAL PHARMACY W/ HCPCS (ALT 636 FOR OP/ED): Performed by: EMERGENCY MEDICINE

## 2024-09-19 PROCEDURE — 93308 TTE F-UP OR LMTD: CPT | Performed by: INTERNAL MEDICINE

## 2024-09-19 PROCEDURE — 96375 TX/PRO/DX INJ NEW DRUG ADDON: CPT | Mod: 59

## 2024-09-19 PROCEDURE — 83735 ASSAY OF MAGNESIUM: CPT | Performed by: STUDENT IN AN ORGANIZED HEALTH CARE EDUCATION/TRAINING PROGRAM

## 2024-09-19 PROCEDURE — 2500000005 HC RX 250 GENERAL PHARMACY W/O HCPCS: Performed by: INTERNAL MEDICINE

## 2024-09-19 PROCEDURE — 93312 ECHO TRANSESOPHAGEAL: CPT | Mod: XP

## 2024-09-19 PROCEDURE — 96365 THER/PROPH/DIAG IV INF INIT: CPT | Mod: 59

## 2024-09-19 PROCEDURE — 93325 DOPPLER ECHO COLOR FLOW MAPG: CPT

## 2024-09-19 PROCEDURE — 96366 THER/PROPH/DIAG IV INF ADDON: CPT | Mod: 59

## 2024-09-19 PROCEDURE — 3700000002 HC GENERAL ANESTHESIA TIME - EACH INCREMENTAL 1 MINUTE

## 2024-09-19 PROCEDURE — 83036 HEMOGLOBIN GLYCOSYLATED A1C: CPT | Mod: AHULAB | Performed by: STUDENT IN AN ORGANIZED HEALTH CARE EDUCATION/TRAINING PROGRAM

## 2024-09-19 PROCEDURE — 85520 HEPARIN ASSAY: CPT | Mod: 91 | Performed by: STUDENT IN AN ORGANIZED HEALTH CARE EDUCATION/TRAINING PROGRAM

## 2024-09-19 PROCEDURE — 85520 HEPARIN ASSAY: CPT | Performed by: EMERGENCY MEDICINE

## 2024-09-19 PROCEDURE — 80061 LIPID PANEL: CPT | Performed by: STUDENT IN AN ORGANIZED HEALTH CARE EDUCATION/TRAINING PROGRAM

## 2024-09-19 PROCEDURE — 1036F TOBACCO NON-USER: CPT | Performed by: INTERNAL MEDICINE

## 2024-09-19 PROCEDURE — 2500000001 HC RX 250 WO HCPCS SELF ADMINISTERED DRUGS (ALT 637 FOR MEDICARE OP): Performed by: STUDENT IN AN ORGANIZED HEALTH CARE EDUCATION/TRAINING PROGRAM

## 2024-09-19 PROCEDURE — 2500000002 HC RX 250 W HCPCS SELF ADMINISTERED DRUGS (ALT 637 FOR MEDICARE OP, ALT 636 FOR OP/ED)

## 2024-09-19 PROCEDURE — 80053 COMPREHEN METABOLIC PANEL: CPT

## 2024-09-19 PROCEDURE — 2500000004 HC RX 250 GENERAL PHARMACY W/ HCPCS (ALT 636 FOR OP/ED): Performed by: ANESTHESIOLOGIST ASSISTANT

## 2024-09-19 PROCEDURE — 93321 DOPPLER ECHO F-UP/LMTD STD: CPT | Performed by: INTERNAL MEDICINE

## 2024-09-19 PROCEDURE — 36415 COLL VENOUS BLD VENIPUNCTURE: CPT

## 2024-09-19 PROCEDURE — 3700000001 HC GENERAL ANESTHESIA TIME - INITIAL BASE CHARGE

## 2024-09-19 PROCEDURE — 99223 1ST HOSP IP/OBS HIGH 75: CPT | Performed by: NURSE PRACTITIONER

## 2024-09-19 PROCEDURE — 2500000001 HC RX 250 WO HCPCS SELF ADMINISTERED DRUGS (ALT 637 FOR MEDICARE OP): Performed by: NURSE PRACTITIONER

## 2024-09-19 PROCEDURE — 93325 DOPPLER ECHO COLOR FLOW MAPG: CPT | Performed by: INTERNAL MEDICINE

## 2024-09-19 PROCEDURE — 93312 ECHO TRANSESOPHAGEAL: CPT | Performed by: INTERNAL MEDICINE

## 2024-09-19 PROCEDURE — 2500000001 HC RX 250 WO HCPCS SELF ADMINISTERED DRUGS (ALT 637 FOR MEDICARE OP)

## 2024-09-19 PROCEDURE — 2500000004 HC RX 250 GENERAL PHARMACY W/ HCPCS (ALT 636 FOR OP/ED)

## 2024-09-19 PROCEDURE — 93005 ELECTROCARDIOGRAM TRACING: CPT

## 2024-09-19 PROCEDURE — 36415 COLL VENOUS BLD VENIPUNCTURE: CPT | Performed by: EMERGENCY MEDICINE

## 2024-09-19 PROCEDURE — 85027 COMPLETE CBC AUTOMATED: CPT

## 2024-09-19 RX ORDER — ACETAMINOPHEN 650 MG/1
650 SUPPOSITORY RECTAL EVERY 4 HOURS PRN
Status: DISCONTINUED | OUTPATIENT
Start: 2024-09-19 | End: 2024-09-19 | Stop reason: HOSPADM

## 2024-09-19 RX ORDER — METOPROLOL TARTRATE 25 MG/1
25 TABLET, FILM COATED ORAL 2 TIMES DAILY
Qty: 60 TABLET | Refills: 0 | Status: SHIPPED | OUTPATIENT
Start: 2024-09-19

## 2024-09-19 RX ORDER — PROPOFOL 10 MG/ML
INJECTION, EMULSION INTRAVENOUS AS NEEDED
Status: DISCONTINUED | OUTPATIENT
Start: 2024-09-19 | End: 2024-09-19

## 2024-09-19 RX ORDER — ACETAMINOPHEN 325 MG/1
650 TABLET ORAL EVERY 4 HOURS PRN
Status: DISCONTINUED | OUTPATIENT
Start: 2024-09-19 | End: 2024-09-19 | Stop reason: HOSPADM

## 2024-09-19 RX ORDER — METOPROLOL TARTRATE 25 MG/1
25 TABLET, FILM COATED ORAL 2 TIMES DAILY
Status: DISCONTINUED | OUTPATIENT
Start: 2024-09-19 | End: 2024-09-19 | Stop reason: HOSPADM

## 2024-09-19 RX ORDER — MIDAZOLAM HYDROCHLORIDE 1 MG/ML
INJECTION INTRAMUSCULAR; INTRAVENOUS
Status: DISCONTINUED
Start: 2024-09-19 | End: 2024-09-19 | Stop reason: HOSPADM

## 2024-09-19 RX ORDER — MIDAZOLAM HYDROCHLORIDE 1 MG/ML
INJECTION, SOLUTION INTRAMUSCULAR; INTRAVENOUS AS NEEDED
Status: DISCONTINUED | OUTPATIENT
Start: 2024-09-19 | End: 2024-09-19

## 2024-09-19 RX ORDER — ACETAMINOPHEN 160 MG/5ML
650 SOLUTION ORAL EVERY 4 HOURS PRN
Status: DISCONTINUED | OUTPATIENT
Start: 2024-09-19 | End: 2024-09-19 | Stop reason: HOSPADM

## 2024-09-19 RX ORDER — LIDOCAINE HYDROCHLORIDE 20 MG/ML
SOLUTION OROPHARYNGEAL AS NEEDED
Status: DISCONTINUED | OUTPATIENT
Start: 2024-09-19 | End: 2024-09-19 | Stop reason: HOSPADM

## 2024-09-19 RX ORDER — POTASSIUM CHLORIDE 1.5 G/1.58G
40 POWDER, FOR SOLUTION ORAL ONCE
Status: COMPLETED | OUTPATIENT
Start: 2024-09-19 | End: 2024-09-19

## 2024-09-19 ASSESSMENT — ENCOUNTER SYMPTOMS
CARDIOVASCULAR NEGATIVE: 1
GASTROINTESTINAL NEGATIVE: 1
CONSTITUTIONAL NEGATIVE: 1
ENDOCRINE NEGATIVE: 1
RESPIRATORY NEGATIVE: 1
NEUROLOGICAL NEGATIVE: 1
EYES NEGATIVE: 1
HEMATOLOGIC/LYMPHATIC NEGATIVE: 1
MUSCULOSKELETAL NEGATIVE: 1
ALLERGIC/IMMUNOLOGIC NEGATIVE: 1
PSYCHIATRIC NEGATIVE: 1

## 2024-09-19 ASSESSMENT — COGNITIVE AND FUNCTIONAL STATUS - GENERAL
DAILY ACTIVITIY SCORE: 24
MOBILITY SCORE: 24

## 2024-09-19 ASSESSMENT — PAIN SCALES - GENERAL
PAINLEVEL_OUTOF10: 4
PAINLEVEL_OUTOF10: 0 - NO PAIN

## 2024-09-19 ASSESSMENT — PAIN - FUNCTIONAL ASSESSMENT
PAIN_FUNCTIONAL_ASSESSMENT: 0-10

## 2024-09-19 ASSESSMENT — ACTIVITIES OF DAILY LIVING (ADL): LACK_OF_TRANSPORTATION: NO

## 2024-09-19 ASSESSMENT — PAIN DESCRIPTION - LOCATION: LOCATION: THROAT

## 2024-09-19 NOTE — CONSULTS
Inpatient consult to Cardiology  Consult performed by: SHILO Noriega  Consult ordered by: SHILO Sharma  Reason for consult: Atrial fibrillation        History Of Present Illness:    Steven Brice is a 59 y.o. male with past medical history significant for ? Mitral valve disease, GERD, Esophageal dysphagia, Laparoscopic left inguinal hernia repair on 8/28/2024, BPH, Lumbar stenosis, Osteoarthritis, Gout.  Presented with palpitations and lightheadedness. Cardiology is consulted for atrial fibrillation.    Patient reports have recent hernia repair end of August.  Reports he was recently cleared to go back to his normal activities.  Saturday he started working out.  Reports while he was on the elliptical he developed dizziness and lightheadedness.  States it was intermittent.  Reports checking his heart rate on the machine and noted it was erratic and irregular.  He did check his carotid pulse which is also irregular.  Initially believed symptoms were secondary to deconditioning and not working out for a while.  States he worked out on Sunday and same thing happened.  He became quickly short of breath and lightheaded.  He went to work on Monday and noticed he was short of breath and lightheaded walking.  Also noted palpitations in his chest.  Denies any chest pain, syncope, lower extremity edema, orthopnea or PND.  He went to see a doctor yesterday and was told his heart rate was irregular and he was advised to present to the emergency room.    At [facility] [place], EKG showed normal sinus rhythm controlled ventricular response HR 82 nonspecific ST abnormality. Chest x-ray showed no acute findings.  High sensitivity troponin 3/4 BUN 17 creatinine 0.97 TSH 1.26 D-dimer 431 WBC 5.9 hemoglobin 15.0 hematocrit 44.0 platelets 192. Initial vital signs temp 37.1 HR 91 RR 18 /87 pulse ox 100% on room air. He was treated with heparin infusion.  He currently remains in atrial fibrillation on  "telemetry.    Of note, patient denies any prior cardiac history.  Does report being told in the past that he had a leaky mitral valve.  Remembers being told there was nothing to worry about.    No home CV meds         Last Recorded Vitals:  Vitals:    09/18/24 1930 09/18/24 2145 09/18/24 2330 09/19/24 0431   BP: 150/61 (!) 146/98 123/79 112/78   BP Location:  Right arm Right arm Right arm   Patient Position:  Lying Lying Lying   Pulse: 65 77 79 77   Resp: 17 16 17 17   Temp:  37 °C (98.6 °F) 36.7 °C (98.1 °F) 36.6 °C (97.9 °F)   TempSrc:  Temporal Temporal Temporal   SpO2: 100% 100% 98% 100%   Weight:       Height:           Last Labs:  LABS:  CMP:  Results from last 7 days   Lab Units 09/19/24  0413 09/18/24  1526   SODIUM mmol/L 138 139   POTASSIUM mmol/L 3.8 4.0   CHLORIDE mmol/L 107 107   CO2 mmol/L 24 22   ANION GAP mmol/L 11 14   BUN mg/dL 17 17   CREATININE mg/dL 1.01 0.97   EGFR mL/min/1.73m*2 85 89   ALBUMIN g/dL 3.5 4.3   ALT U/L 15 19   AST U/L 15 17   BILIRUBIN TOTAL mg/dL 0.7 0.6     CBC:  Results from last 7 days   Lab Units 09/19/24  0413 09/18/24  1526   WBC AUTO x10*3/uL 5.1 5.9   HEMOGLOBIN g/dL 14.2 15.0   HEMATOCRIT % 40.5* 44.0   PLATELETS AUTO x10*3/uL 183 192   MCV fL 90 91     COAG:     ABO: No results found for: \"ABO\"  HEME/ENDO:  Results from last 7 days   Lab Units 09/18/24  1526   TSH mIU/L 1.26      CARDIAC:   Results from last 7 days   Lab Units 09/18/24  1657 09/18/24  1526   TROPHS ng/L 4 3     Recent Labs     04/29/21  0807   CHOL 189   LDLF 114*   HDL 49.1   TRIG 130      Imagine Results  XR chest 2 views   Final Result   No acute process.   Signed by Leonides Allen MD      Transthoracic Echo (TTE) Complete    (Results Pending)        Last I/O:  No intake/output data recorded.    Past Cardiology Tests (Last 3 Years):  EKG:  Admit- atrial fibrillation nonspecific ST abnormality HR 82      Echo:  No results found for this or any previous visit from the past 1095 days.    Cath:  No " results found for this or any previous visit from the past 1095 days.    Stress Test:  Stress echo 5/13/2015      Cardiac Imaging:  CT calcium score 10/26/2012  The calcium score of the individual coronary arteries is as follows:  LM                     0  LAD                   4.91     LCx                    0  RCA                   4.91    CT calcium score 11/6/2018  LM 0  LAD 10.3  LCx 0  RCA 33.4  Total 44    Past Medical History:  He has a past medical history of Fracture of nasal bones, initial encounter for closed fracture, GERD (gastroesophageal reflux disease), Gout, Mitral valve disease, OA (osteoarthritis), Personal history of (healed) traumatic fracture, Personal history of other diseases of the digestive system, Unspecified injury of unspecified ankle, initial encounter, and Vertigo.    Past Surgical History:  He has a past surgical history that includes Hemorrhoid surgery (06/27/2014) and Hernia repair.      Social History:  He reports that he has never smoked. He has never been exposed to tobacco smoke. He has never used smokeless tobacco. He reports current alcohol use of about 15.0 standard drinks of alcohol per week. He reports current drug use. Frequency: 1.00 time per week. Drug: Other.    Family History:  Family History   Problem Relation Name Age of Onset    Stroke Mother      Leukemia Father          Allergies:  Patient has no known allergies.    Inpatient Medications:  Scheduled medications   Medication Dose Route Frequency    docusate sodium  100 mg oral BID    pantoprazole  40 mg oral Daily before breakfast    Or    pantoprazole  40 mg intravenous Daily before breakfast    polyethylene glycol  17 g oral Daily    tamsulosin  0.4 mg oral Daily     PRN medications   Medication    heparin     Continuous Medications   Medication Dose Last Rate    heparin  0-4,500 Units/hr 1,700 Units/hr (09/19/24 0623)     Outpatient Medications:  Current Outpatient Medications   Medication Instructions     tamsulosin (FLOMAX) 0.4 mg, oral, Daily       Physical Exam:  GENERAL: alert, cooperative, pleasant, in no acute distress  SKIN: warm, dry  NECK: no JVD  CARDIAC: Irregularly irregular no murmurs  PULMONARY: Normal respiratory efforts, lungs clear to auscultation bilaterally.  ABDOMEN: soft, nondistended  EXTREMITIES: no lower extremity edema  NEURO: Alert and oriented x 3.  Grossly normal.  Moves all 4 extremities.     I reviewed telemetry which showed atrial fibrillation SVR/CVR brief RVR (HR 120s)    Assessment/Plan   Steven Brice is a 59 y.o. male with past medical history significant for ? Mitral valve disease, GERD, Esophageal dysphagia, Laparoscopic left inguinal hernia repair on 8/28/2024, BPH, Lumbar stenosis, Osteoarthritis, Gout.  Presented with palpitations and lightheadedness. Cardiology is consulted for atrial fibrillation.    New onset atrial fibrillation   With symptoms including shortness of breath, dizziness and lightheadedness.  Benefits from rhythm control.    -Recommend starting morning metoprolol tartrate 25 mg BID for rate control  -Echocardiogram  -Will proceed with LAKESHA guided cardioversion today with  Dr. Smith   -Start Eliquis 5 mg BID for anticoagulation. Would need to remain on AC for at least one month.   -Patient advised to reduce alcohol intake    Code Status:  Full Code    Keri Nuno, KEYANNA-CNP    STAFF ADDENDUM:    Both the SHERRY and I have had a face to face encounter with the patient today. I have examined the patient and edited the documented physical examination as necessary.  I personally reviewed the patient's vital signs, telemetry, recent labs, medications, orders, EKGs, and pertinent cardiac imaging/ echocardiography.  I have reviewed the SHERRY's encounter note, approve the SHERRY's documentation and have edited the note to reflect my diagnostic and therapeutic plan.      59-year-old male with history of GERD with esophagitis on EGD 2021, hernia repair, BPH presents  with several days of progressive dyspnea, dizziness and lightheadedness.  He had inguinal surgery a couple of weeks ago and since that time has had diminished mobilization and a general sense of deconditioning.  He started to increase activity about 5 days ago and noticed irregular pulse and rhythm as well as easy tachycardia with heart rates in the 140s with minimal activity and some occasional dizziness.  He thought initially might just be deconditioning after surgery.  He went to see physician who recommended he come to the ER for an EKG and further evaluation.  Upon arrival to the ER he was found to be in A-fib with heart rates in the 80s.    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  Admits to drinking 4-5 beverages on a regular basis per day  No tobacco  Regular marijuana use    Exam:  GENERAL: alert, cooperative, pleasant, in no acute distress  SKIN: warm, dry, no rash.  NECK: no JVD, no JOSH  CARDIAC: irregular rate and rhythm with no rubs, murmurs, or gallops  CHEST: Normal respiratory efforts, lungs clear to auscultation bilaterally.  ABDOMEN: soft, nontender, nondistended  EXTREMITIES: no edema  NEURO: Alert and oriented x 3.  Grossly normal.  Moves all 4 extremities.    1.  Recent onset atrial fibrillation with intermittent RVR.  Suspect persistent and may have even become around the time of his hernia repair.  -CHADsVasc 0  -He would benefit from rhythm control due to symptomatic status.  We reviewed options including LAKESHA and cardioversion today versus 3 weeks of anticoagulation and then elective outpatient cardioversion.  Due to need to get back to work sooner as well as upcoming prostate biopsy he preferred to get a LAKESHA and cardioversion for quicker treatment and symptomatic relief.    Plan/recommendations:  Stop heparin and start Eliquis 5 mg twice daily  Baseline TTE pending  LAKESHA and cardioversion later today.  Anticipate 1 month of anticoagulation and then  discontinuing after cardioversion due to his low chadsvasc score  Counseled on ETOH cessation      Gomez Smith DO

## 2024-09-19 NOTE — DISCHARGE SUMMARY
Discharge Diagnosis  Atrial fibrillation (Multi)    Issues Requiring Follow-Up  Cardiology follow up     Discharge Meds     Medication List      START taking these medications     apixaban 5 mg tablet; Commonly known as: Eliquis; Take 1 tablet (5 mg)   by mouth every 12 hours.   metoprolol tartrate 25 mg tablet; Commonly known as: Lopressor; Take 1   tablet (25 mg) by mouth 2 times a day.     CONTINUE taking these medications     Flomax 0.4 mg 24 hr capsule; Generic drug: tamsulosin       Test Results Pending At Discharge  Pending Labs       No current pending labs.            Hospital Course    Steven Brice is a 59 y.o. male with past medical history significant for ? Mitral valve disease, GERD, Esophageal dysphagia, Laparoscopic left inguinal hernia repair on 8/28/2024, BPH, Lumbar stenosis, Osteoarthritis, Gout.  Presented with palpitations and lightheadedness.      Patient reports have recent hernia repair end of August.  Reports he was recently cleared to go back to his normal activities.  Saturday he started working out.  Reports while he was on the elliptical he developed dizziness and lightheadedness.  States it was intermittent.  Reports checking his heart rate on the machine and noted it was erratic and irregular.  He did check his carotid pulse which is also irregular.  Initially believed symptoms were secondary to deconditioning and not working out for a while.  States he worked out on Sunday and same thing happened.  He became quickly short of breath and lightheaded.  He went to work on Monday and noticed he was short of breath and lightheaded walking.  Also noted palpitations in his chest.  Denies any chest pain, syncope, lower extremity edema, orthopnea or PND.  He went to see a doctor yesterday and was told his heart rate was irregular and he was advised to present to the emergency room.     At [facility], EKG showed normal sinus rhythm controlled ventricular response HR 82 nonspecific  ST abnormality. Chest x-ray showed no acute findings.  High sensitivity troponin 3/4 BUN 17 creatinine 0.97 TSH 1.26 D-dimer 431 WBC 5.9 hemoglobin 15.0 hematocrit 44.0 platelets 192. Initial vital signs temp 37.1 HR 91 RR 18 /87 pulse ox 100% on room air. He was treated with heparin infusion.  He currently remains in atrial fibrillation on telemetry.     Of note, patient denies any prior cardiac history.  Does report being told in the past that he had a leaky mitral valve.  Remembers being told there was nothing to worry about. Cardiology was consulted. Patient was started on metoprolol. LAKESHA guided cardioversion was performed with Dr. Smith which was successful. He was started on eliquis 5mg BID for anticoagulation and will need to remain on it for 1 month. Patient will be discharged with cardiology followup.     Pertinent Physical Exam At Time of Discharge  Physical Exam  Constitutional:       Appearance: Normal appearance.   HENT:      Mouth/Throat:      Mouth: Mucous membranes are moist.   Cardiovascular:      Rate and Rhythm: Normal rate and regular rhythm.   Pulmonary:      Effort: Pulmonary effort is normal.      Breath sounds: Normal breath sounds.   Abdominal:      General: Abdomen is flat.      Palpations: Abdomen is soft.   Skin:     General: Skin is warm.   Neurological:      General: No focal deficit present.      Mental Status: He is alert and oriented to person, place, and time.         Outpatient Follow-Up  Future Appointments   Date Time Provider Department Center   10/21/2024 11:[facility]         Judith Reno MD

## 2024-09-19 NOTE — ANESTHESIA PREPROCEDURE EVALUATION
Patient: Steven Brice    Procedure Information       Date/Time: 09/19/24 1100    Scheduled providers: Gomez Smith DO    Procedure: TRANSESOPHAGEAL ECHO (LAKESHA) W/ POSSIBLE CARDIOVERSION    Location: [facility]; [facility]            Relevant Problems   Cardiac   (+) Atrial fibrillation (Multi)   (+) Mitral valve disease      GI   (+) Esophageal dysphagia   (+) Esophageal reflux      Musculoskeletal   (+) Chronic neck pain   (+) Osteoarthritis   (+) Primary localized osteoarthrosis of left lower leg      HEENT   (+) Asymmetrical sensorineural hearing loss   (+) Bilateral sensorineural hearing loss   (+) Chronic sinusitis, unspecified   (+) Hearing loss      ID   (+) Blastocystis hominis infection   (+) COVID-19   (+) Travelers' diarrhea       Clinical information reviewed:   Tobacco  Allergies  Meds   Med Hx  Surg Hx   Fam Hx  Soc Hx         Past Medical History:   Diagnosis Date    Atrial fibrillation (Multi)     GERD (gastroesophageal reflux disease)     Gout     OA (osteoarthritis)     Vertigo       Past Surgical History:   Procedure Laterality Date    COLONOSCOPY      HEMORRHOID SURGERY  06/27/2014    Hemorrhoidectomy    HERNIA REPAIR      x3 -- 1987 and 2010 and 8/28/2024    KNEE ARTHROSCOPY W/ DEBRIDEMENT       Social History     Tobacco Use    Smoking status: Never     Passive exposure: Never    Smokeless tobacco: Never   Vaping Use    Vaping status: Never Used   Substance Use Topics    Alcohol use: Yes     Alcohol/week: 15.0 standard drinks of alcohol     Types: 5 Glasses of wine, 5 Cans of beer, 5 Shots of liquor per week     Comment: states 15-20 drinks a week per pt    Drug use: Yes     Frequency: 1.0 times per week     Types: Other     Comment: cannabis gummies per pt one time a week      Current Outpatient Medications   Medication Instructions    tamsulosin (FLOMAX) 0.4 mg, oral, Daily      No Known Allergies     Chemistry    Lab Results   Component Value Date/Time  "    09/19/2024 0413    K 3.8 09/19/2024 0413     09/19/2024 0413    CO2 24 09/19/2024 0413    BUN 17 09/19/2024 0413    CREATININE 1.01 09/19/2024 0413    Lab Results   Component Value Date/Time    CALCIUM 8.4 (L) 09/19/2024 0413    ALKPHOS 42 09/19/2024 0413    AST 15 09/19/2024 0413    ALT 15 09/19/2024 0413    BILITOT 0.7 09/19/2024 0413          Lab Results   Component Value Date    HGBA1C 4.6 04/29/2021     Lab Results   Component Value Date/Time    WBC 5.1 09/19/2024 0413    HGB 14.2 09/19/2024 0413    HCT 40.5 (L) 09/19/2024 0413     09/19/2024 0413     No results found for: \"PROTIME\", \"PTT\", \"INR\"  No results found for: \"ABORH\"  No results found for this or any previous visit (from the past 4464 hour(s)).  No results found for this or any previous visit from the past 1095 days.   Echo 9/19/2024:  Left Ventricle: Left ventricular ejection fraction is mildly decreased, calculated by Perez's biplane at 49%. The patient is in atrial fibrillation which may influence the estimate of left ventricular function and transvalvular flows. There are no regional left ventricular wall motion abnormalities. The left ventricular cavity size is normal. Spectral Doppler shows a normal pattern of left ventricular diastolic filling.  Left Atrium: The left atrium is normal in size.  Right Ventricle: The right ventricle is normal in size. There is normal right ventricular global systolic function.  Right Atrium: The right atrium is normal in size.  Aortic Valve: The aortic valve is trileaflet. There is no evidence of aortic valve regurgitation.  Mitral Valve: The mitral valve is normal in structure. There is no evidence of mitral valve regurgitation.  Tricuspid Valve: The tricuspid valve is structurally normal. There is trace tricuspid regurgitation. The Doppler estimated RVSP is within normal limits at 19.2 mmHg.  Pulmonic Valve: The pulmonic valve is structurally normal. There is no indication of pulmonic " "valve regurgitation.  Pericardium: There is no pericardial effusion noted.  Aorta: The aortic root is normal.  In comparison to the previous echocardiogram(s): There are no prior studies on this patient for comparison purposes.     CONCLUSIONS:   1. Left ventricular ejection fraction is mildly decreased, calculated by Perez's biplane at 49%.   2. There is normal right ventricular global systolic function.   3. Right ventricular systolic pressure is within normal limits.   4. The patient is in atrial fibrillation which may influence the estimate of left ventricular function and transvalvular flows.    Visit Vitals  /80   Pulse 56   Temp 36 °C (96.8 °F) (Temporal)   Resp 18   Ht 1.956 m (6' 5\")   Wt 103 kg (227 lb)   SpO2 100%   BMI 26.92 kg/m²   Smoking Status Never   BSA 2.37 m²     NPO/Void Status  Date of Last Liquid: 09/19/24  Time of Last Liquid: 0800  Date of Last Solid: 09/19/24  Time of Last Solid: 0000  Last Intake Type: Clear fluids        Physical Exam    Airway  Mallampati: III  TM distance: >3 FB  Neck ROM: full     Cardiovascular - normal exam  Rhythm: irregular  Rate: normal     Dental    Pulmonary - normal exam     Abdominal - normal exam             Anesthesia Plan    History of general anesthesia?: yes  History of complications of general anesthesia?: no    ASA 3     MAC   (With standard ASA monitoring.)  intravenous induction   Anesthetic plan and risks discussed with patient.    Plan discussed with CRNA and CAA.        "

## 2024-09-19 NOTE — NURSING NOTE
All discharge teaching completed. Pt. Tele dc'd NSR 60's. Discussed bleeding precautions with eliquis. Aware of need to call urologist to reschedule biopsy . No additional questions or concerns. Discharged home.

## 2024-09-19 NOTE — ED PROVIDER NOTES
HPI   Chief Complaint   Patient presents with    Palpitations    Dizziness       HPI  Patient is a 59-year-old male with a past medical history significant for prostate disease pending biopsy, recent inguinal hernia repair with Dr. Faye at the end of August who presents emergency room with chief complaint of palpitations, lightheadedness.  Patient states that he was feeling improved in his usual state of health getting back to work when he suddenly felt like his heart rate was starting to be irregular.  He noted it more when he was working out but also noted fast irregular beats throughout his day.  He started feeling lightheaded and like the irregular heartbeat was taking his breath away but denies overt shortness of breath.  Denies any leg pain or swelling.  Denies any trouble with an inguinal hernia site.  He denies any chest pain, nausea, vomiting or loss of consciousness.  He denies any history of atrial fibrillation or heart disease.  He is not anticoagulated.  Denies any contraindication to heparinization.      PMHx: As above  PSHx: As above  FamilyHx: Leukemia  SocialHx: Endorses alcohol use 3-4 times per week  Allergies: NKDA  Medications: See Medication Reconciliation     ROS  As above otherwise denies      Physical Exam    GENERAL: Awake and Alert, No Acute Distress  HEENT: AT/NC, PERRL, EOMI, Normal Oropharynx, No Signs of Dehydration  NECK: Normal Inspection, No JVD  CARDIOVASCULAR: Irregularly irregular but rate controlled, No M/R/G  RESPIRATORY: CTA Bilaterally, No Wheezes, Rales or Rhonchi, Chest Wall Non-tender  ABDOMEN: Soft, non-tender abdomen, Normal Bowel Sounds, No Distention  BACK: No CVA Tenderness  SKIN: Normal Color, Warm, Dry, No Rashes   EXTREMITIES: Non-Tender, Full ROM, No Pedal Edema  NEURO: A&O x 3, Normal Motor and Sensation, Normal Mood and Affect    Nursing Assessment and Vitals Reviewed    EG showed atrial fibrillation at 82 bpm.  No significant interval prolongations or ischemic  ST changes.  No axis deviation.    Medical Decision  Patient is a 59-year-old male with a past medical history significant for prostate disease pending biopsy, recent inguinal hernia repair with Dr. Faye at the end of August who presents emergency room with chief complaint of palpitations, lightheadedness.  Patient states that he was feeling improved in his usual state of health getting back to work when he suddenly felt like his heart rate was starting to be irregular.  He noted it more when he was working out but also noted fast irregular beats throughout his day.  He started feeling lightheaded and like the irregular heartbeat was taking his breath away but denies overt shortness of breath.  Denies any leg pain or swelling.  Denies any trouble with an inguinal hernia site.  He denies any chest pain, nausea, vomiting or loss of consciousness.  He denies any history of atrial fibrillation or heart disease.  He is not anticoagulated.  Denies any contraindication to heparinization.    On evaluation he is well-appearing and in no acute distress.  Lungs are clear and heart is irregular.  Abdomen is soft, nontender nondistended.      Patient will be worked up for new onset atrial fibrillation per EKG.  Workup for patient thus far included labs that revealed no electrolyte imbalance.  Troponin is within normal limits.  D-dimer is negative.  He has no leukocytosis or anemia.  Chest x-ray is unremarkable.  Patient denied any contraindication to heparinization and is started on heparin.  He is admitted for further workup and management to observation team.              Patient History   Past Medical History:   Diagnosis Date    Fracture of nasal bones, initial encounter for closed fracture     Fractured nose    GERD (gastroesophageal reflux disease)     Gout     Mitral valve disease     OA (osteoarthritis)     Personal history of (healed) traumatic fracture     History of fracture of finger    Personal history of other  diseases of the digestive system     History of hemorrhoids    Unspecified injury of unspecified ankle, initial encounter     Ankle injury    Vertigo      Past Surgical History:   Procedure Laterality Date    HEMORRHOID SURGERY  06/27/2014    Hemorrhoidectomy    HERNIA REPAIR      x 2 -- 1987 and 2010 per pt     Family History   Problem Relation Name Age of Onset    Stroke Mother      Leukemia Father       Social History     Tobacco Use    Smoking status: Never     Passive exposure: Never    Smokeless tobacco: Never   Vaping Use    Vaping status: Never Used   Substance Use Topics    Alcohol use: Yes     Alcohol/week: 15.0 standard drinks of alcohol     Types: 5 Glasses of wine, 5 Cans of beer, 5 Shots of liquor per week     Comment: states 15-20 drinks a week per pt    Drug use: Yes     Frequency: 1.0 times per week     Types: Other     Comment: cannabis gummies per pt one time a week       Physical Exam   ED Triage Vitals [09/18/24 1458]   Temperature Heart Rate Respirations BP   37.1 °C (98.7 °F) 91 18 144/87      Pulse Ox Temp Source Heart Rate Source Patient Position   100 % Temporal Monitor Sitting      BP Location FiO2 (%)     Left arm --       Physical Exam      ED Course & MDM   ED Course as of 09/18/24 2022   Wed Sep 18, 2024   1524 ECG 12 Lead  EKG ordered and interpreted by ED physician demonstrates atrial fibrillation, 82 bpm.  Nonspecific ST-T wave abnormalities but no overt ischemic findings.  Last documentation of heart rhythm in EMR states sinus rhythm from a stress test in 2015. [KS]      ED Course User Index  [KS] Khadar Kong MD MPH         Diagnoses as of 09/18/24 2022   Atrial fibrillation, unspecified type (Multi)                 No data recorded     [place] Coma Scale Score: 15 (09/18/24 1458 : Matilde Ventura RN)                           Medical Decision Making      Procedure  Procedures     Tammy Fu MD  09/18/24 2022

## 2024-09-19 NOTE — H&P
History Of Present Illness  Steven Brice is a 59 year old male who presents emergency room with chief complaint of palpitations, lightheadedness.    Past medical history: prostate disease pending biopsy, recent laparoscopic left inguinal hernia repair with Dr. Faye on 8/28/24     About a week ago, patient reports he was cleared post-op to resume normal activities including exercise. Patient went to the gym 2 days ago, and while doing the elliptical felt his heart begin to race. Proceeded to work out the next day as well, and felt the same thing. Reported feeling his own pulse, and it felt irregular to him.  He started feeling lightheaded and like the irregular heartbeat was taking his breath away but denies overt shortness of breath. He told his daughter, who is an RN, who felt that he may be in afib. Denies any leg pain or swelling. Denies any trouble with an inguinal hernia site. He denies any chest pain, nausea, vomiting or loss of consciousness. He denies any history of atrial fibrillation or heart disease, denies any history of elevated blood pressure. Denies any recent illnesses. Reports poor sleep quality due to chronic back pain/ right rotator cuff injury that needs repair. Drinks 2-3 alcoholic beverages about 4 times a week. Smokes marijuana occasionally, last use was last Thursday at a concert.     ED work up  EKG: atrial fibrillation, 82 bpm  Vital signs: 98.7 oral temp, 91 HR, 18 RR, 144/87 BP and 100% on room air  CXR: No acute findings  Trops: 3/4    Initiated heparin infusion and admitted to observation for cardiology consultation tomorrow     Past Medical History  He has a past medical history of Fracture of nasal bones, initial encounter for closed fracture, GERD (gastroesophageal reflux disease), Gout, Mitral valve disease, OA (osteoarthritis), Personal history of (healed) traumatic fracture, Personal history of other diseases of the digestive system, Unspecified injury of unspecified ankle, initial  encounter, and Vertigo.    Surgical History  He has a past surgical history that includes Hemorrhoid surgery (06/27/2014) and Hernia repair.     Social History  He reports that he has never smoked. He has never been exposed to tobacco smoke. He has never used smokeless tobacco. He reports current alcohol use of about 15.0 standard drinks of alcohol per week. He reports current drug use. Frequency: 1.00 time per week. Drug: Other.    Family History  Family History   Problem Relation Name Age of Onset    Stroke Mother      Leukemia Father          Allergies  Patient has no known allergies.    Review of Systems   Constitutional: Negative.    HENT: Negative.     Eyes: Negative.    Respiratory:  Positive for shortness of breath.    Cardiovascular:  Positive for palpitations.   Gastrointestinal: Negative.    Endocrine: Negative.    Genitourinary: Negative.    Musculoskeletal: Negative.    Skin: Negative.    Allergic/Immunologic: Negative.    Neurological:  Positive for light-headedness.   Hematological: Negative.    Psychiatric/Behavioral: Negative.     All other systems reviewed and are negative.       Physical Exam  Vitals and nursing note reviewed.   Constitutional:       Appearance: Normal appearance.   HENT:      Head: Normocephalic.      Nose: Nose normal.      Mouth/Throat:      Lips: Pink.      Mouth: Mucous membranes are moist.      Pharynx: Oropharynx is clear.   Eyes:      General: Lids are normal. Lids are everted, no foreign bodies appreciated. Vision grossly intact. Gaze aligned appropriately.      Extraocular Movements: Extraocular movements intact.      Conjunctiva/sclera: Conjunctivae normal.   Neck:      Trachea: Trachea normal.   Cardiovascular:      Rate and Rhythm: Normal rate. Rhythm irregular.      Pulses: Normal pulses.      Heart sounds: Normal heart sounds.   Pulmonary:      Effort: Pulmonary effort is normal.      Breath sounds: Normal breath sounds.   Abdominal:      General: Abdomen is flat.  Bowel sounds are normal.      Palpations: Abdomen is soft.   Musculoskeletal:         General: Normal range of motion.      Cervical back: Full passive range of motion without pain, normal range of motion and neck supple.   Feet:      Right foot:      Skin integrity: Skin integrity normal.      Left foot:      Skin integrity: Skin integrity normal.   Skin:     General: Skin is warm and dry.      Capillary Refill: Capillary refill takes less than 2 seconds.   Neurological:      General: No focal deficit present.      Mental Status: He is alert and oriented to person, place, and time. Mental status is at baseline.   Psychiatric:         Attention and Perception: Attention and perception normal.         Mood and Affect: Mood and affect normal.         Speech: Speech normal.         Behavior: Behavior normal. Behavior is cooperative.         Thought Content: Thought content normal.         Cognition and Memory: Cognition normal.         Judgment: Judgment normal.          Last Recorded Vitals  /61   Pulse 65   Temp 37.1 °C (98.7 °F) (Temporal)   Resp 17   Wt 103 kg (227 lb)   SpO2 100%     Relevant Results  ,Scheduled medications  [START ON 9/19/2024] pantoprazole, 40 mg, oral, Daily before breakfast   Or  [START ON 9/19/2024] pantoprazole, 40 mg, intravenous, Daily before breakfast  [START ON 9/19/2024] polyethylene glycol, 17 g, oral, Daily  [START ON 9/19/2024] tamsulosin, 0.4 mg, oral, Daily      Continuous medications  heparin, 0-4,500 Units/hr, Last Rate: 1,900 Units/hr (09/18/24 2042)      PRN medications  PRN medications: heparin  Results for orders placed or performed during the hospital encounter of 09/18/24 (from the past 24 hour(s))   CBC and Auto Differential   Result Value Ref Range    WBC 5.9 4.4 - 11.3 x10*3/uL    nRBC 0.0 0.0 - 0.0 /100 WBCs    RBC 4.85 4.50 - 5.90 x10*6/uL    Hemoglobin 15.0 13.5 - 17.5 g/dL    Hematocrit 44.0 41.0 - 52.0 %    MCV 91 80 - 100 fL    MCH 30.9 26.0 - 34.0 pg     MCHC 34.1 32.0 - 36.0 g/dL    RDW 12.6 11.5 - 14.5 %    Platelets 192 150 - 450 x10*3/uL    Neutrophils % 59.9 40.0 - 80.0 %    Immature Granulocytes %, Automated 0.3 0.0 - 0.9 %    Lymphocytes % 27.7 13.0 - 44.0 %    Monocytes % 8.8 2.0 - 10.0 %    Eosinophils % 2.6 0.0 - 6.0 %    Basophils % 0.7 0.0 - 2.0 %    Neutrophils Absolute 3.52 1.20 - 7.70 x10*3/uL    Immature Granulocytes Absolute, Automated 0.02 0.00 - 0.70 x10*3/uL    Lymphocytes Absolute 1.63 1.20 - 4.80 x10*3/uL    Monocytes Absolute 0.52 0.10 - 1.00 x10*3/uL    Eosinophils Absolute 0.15 0.00 - 0.70 x10*3/uL    Basophils Absolute 0.04 0.00 - 0.10 x10*3/uL   Comprehensive Metabolic Panel   Result Value Ref Range    Glucose 100 (H) 74 - 99 mg/dL    Sodium 139 136 - 145 mmol/L    Potassium 4.0 3.5 - 5.3 mmol/L    Chloride 107 98 - 107 mmol/L    Bicarbonate 22 21 - 32 mmol/L    Anion Gap 14 10 - 20 mmol/L    Urea Nitrogen 17 6 - 23 mg/dL    Creatinine 0.97 0.50 - 1.30 mg/dL    eGFR 89 >60 mL/min/1.73m*2    Calcium 8.9 8.6 - 10.3 mg/dL    Albumin 4.3 3.4 - 5.0 g/dL    Alkaline Phosphatase 53 33 - 136 U/L    Total Protein 6.8 6.4 - 8.2 g/dL    AST 17 9 - 39 U/L    Bilirubin, Total 0.6 0.0 - 1.2 mg/dL    ALT 19 10 - 52 U/L   D-Dimer, VTE Exclusion   Result Value Ref Range    D-Dimer, Quantitative VTE Exclusion 431 <=500 ng/mL FEU   Troponin I, High Sensitivity, Initial   Result Value Ref Range    Troponin I, High Sensitivity 3 0 - 20 ng/L   Troponin, High Sensitivity, 1 Hour   Result Value Ref Range    Troponin I, High Sensitivity 4 0 - 20 ng/L     XR chest 2 views    Result Date: 9/18/2024  STUDY: Chest Radiographs;  9/18/2024 3:46PM INDICATION: Shortness of breath. COMPARISON: None available. ACCESSION NUMBER(S): [id number] ORDERING CLINICIAN: CANDACE JAMA TECHNIQUE:  Frontal and lateral chest. FINDINGS: CARDIOMEDIASTINAL SILHOUETTE: Cardiomediastinal silhouette is normal in size and configuration.  LUNGS: Lungs are clear.  ABDOMEN: No remarkable  upper abdominal findings.  BONES: No acute osseous changes.    No acute process. Signed by Leonides Allen MD     Assessment/Plan   Assessment & Plan  Atrial fibrillation (Multi)    Steven Brice is a 59 year old male who presents emergency room with chief complaint of palpitations, lightheadedness.    Past medical history: prostate disease pending biopsy, recent laparoscopic left inguinal hernia repair with Dr. Faye on 8/28/24     About a week ago, patient reports he was cleared post-op to resume normal activities including exercise. Patient went to the gym 2 days ago, and while doing the elliptical felt his heart begin to race. Proceeded to work out the next day as well, and felt the same thing. Reported feeling his own pulse, and it felt irregular to him.  He started feeling lightheaded and like the irregular heartbeat was taking his breath away but denies overt shortness of breath. He told his daughter, who is an RN, who felt that he may be in afib. Denies any leg pain or swelling. Denies any trouble with an inguinal hernia site. He denies any chest pain, nausea, vomiting or loss of consciousness. He denies any history of atrial fibrillation or heart disease, denies any history of elevated blood pressure. Denies any recent illnesses. Reports poor sleep quality due to chronic back pain/ right rotator cuff injury that needs repair. Drinks 2-3 alcoholic beverages about 4 times a week. Smokes marijuana occasionally, last use was last Thursday at a concert.     New onset atrial fibrillation  Stable, symptomatic: palpations and lightheadedness  EKG: atrial fibrillation, 82 bpm  Rate control, no betablocker indicated at this time  Trops: negative  CXR: negative   TSH with reflex pending  Continue Telemetry   Continue Heparin infusion  NPO after midnight  Appreciate Cardiology input  Echocardiogram    VTE/GI prophylaxis  -heparin gtt/ PPI/ Miralax and colace daily     Labs, results and plan of care to be discussed and  reviewed with Hospitalist Attending    KEYANNA Sharma-CNP

## 2024-09-19 NOTE — CARE PLAN
The patient's goals for the shift include remain safe    The clinical goals for the shift include heparin drip to be theraputic by end of shift      Problem: Safety - Adult  Goal: Free from fall injury  Outcome: Met  Flowsheets (Taken 9/18/2024 2200)  Free from fall injury: Based on caregiver fall risk screen, instruct family/caregiver to ask for assistance with transferring infant if caregiver noted to have fall risk factors

## 2024-09-19 NOTE — DISCHARGE INSTRUCTIONS
Mr. Brice it was a pleasure taking care of you. You were admitted for Atrial fibrillation. You were started on metoprolol and eliquis. Please take eliquis for 30 days. You will be scheduled to follow up with our cardiology team. You had a transesophageal echocardiogram and were cardioverted successfully.   Best,   Your Internal Medicine Team      Transesophageal Echocardiogram:  Post-Procedure and Homegoing Instructions    Please follow the instructions below after your transesophageal echocardiogram:  1) Do not drive or operate machinery for 24 hours after your procedure.  2) Do not eat or drink anything for two hours after your procedure, until ___3pm_________.  Start with a little bit of water to be sure you can swallow without coughing.  3) Avoid alcohol for at least 24 hours after the test.  5) You may have a mild sore throat for a day or two. Cough drops may help after the  two hour wait. Tylenol may also be taken once you can swallow.  6) Although the symptoms below are rare, call your doctor at once, or seek emergency  care if you have:  - Bright red blood in your sputum - Sustained chest pain  - Severe shortness of breath - Severe abdominal pain  - Allergy symptoms (hives, rash, nausea, vomiting, difficulty breathing  7) If you are an out-patient, a nurse will have placed an IV during the study for sedation  and contrast injection. The nurse will remove the IV before she sends you home. Leave  the band-aid on for 24 hours and then check the IV site for signs of infection, such as:  ? Increasing soreness  ? Redness  ? Drainage from the puncture site    If you notice any of these conditions, you should contact your doctor immediately.

## 2024-09-19 NOTE — H&P
History Of Present Illness  Steven Brice is a 59 y.o. male presenting with atrial fibrillation, here for LAKESHA with possible DCCV. PMH includes  ?Mitral valve disease, GERD, Esophageal dysphagia, Laparoscopic left inguinal hernia repair on 8/28/2024, BPH, Lumbar stenosis, Osteoarthritis, Gout     Past Medical History:  Past Medical History:   Diagnosis Date    Fracture of nasal bones, initial encounter for closed fracture     Fractured nose    GERD (gastroesophageal reflux disease)     Gout     Mitral valve disease     OA (osteoarthritis)     Personal history of (healed) traumatic fracture     History of fracture of finger    Personal history of other diseases of the digestive system     History of hemorrhoids    Unspecified injury of unspecified ankle, initial encounter     Ankle injury    Vertigo         Past Surgical History:  Past Surgical History:   Procedure Laterality Date    HEMORRHOID SURGERY  06/27/2014    Hemorrhoidectomy    HERNIA REPAIR      x 2 -- 1987 and 2010 per pt          Social History:   reports that he has never smoked. He has never been exposed to tobacco smoke. He has never used smokeless tobacco. He reports current alcohol use of about 15.0 standard drinks of alcohol per week. He reports current drug use. Frequency: 1.00 time per week. Drug: Other.     Family History:  Family History   Problem Relation Name Age of Onset    Stroke Mother      Leukemia Father          Allergies:  No Known Allergies     Home Medications:  Current Outpatient Medications   Medication Instructions    tamsulosin (FLOMAX) 0.4 mg, oral, Daily       Inpatient Medications:  Scheduled medications   Medication Dose Route Frequency    apixaban  5 mg oral q12h    docusate sodium  100 mg oral BID    metoprolol tartrate  25 mg oral BID    polyethylene glycol  17 g oral Daily    tamsulosin  0.4 mg oral Daily     PRN medications   Medication     Continuous Medications   Medication Dose Last Rate         Review of Systems  "  Constitutional: Negative.    HENT: Negative.     Eyes: Negative.    Respiratory: Negative.     Cardiovascular: Negative.    Gastrointestinal: Negative.    Endocrine: Negative.    Genitourinary: Negative.    Musculoskeletal: Negative.    Skin: Negative.    Allergic/Immunologic: Negative.    Neurological: Negative.    Hematological: Negative.    Psychiatric/Behavioral: Negative.            Physical Exam  Constitutional:       General: He is awake. He is not in acute distress.     Appearance: He is not ill-appearing.   Cardiovascular:      Rate and Rhythm: Normal rate. Rhythm irregularly irregular.      Pulses: Normal pulses.           Radial pulses are 2+ on the right side and 2+ on the left side.        Dorsalis pedis pulses are 2+ on the right side and 2+ on the left side.      Heart sounds: Normal heart sounds. No murmur heard.  Pulmonary:      Effort: Pulmonary effort is normal.      Breath sounds: Normal breath sounds and air entry.   Abdominal:      General: Bowel sounds are normal.      Palpations: Abdomen is soft.      Tenderness: There is no abdominal tenderness.   Musculoskeletal:      Right lower leg: No edema.      Left lower leg: No edema.   Skin:     General: Skin is warm and dry.   Neurological:      General: No focal deficit present.      Mental Status: He is alert and oriented to person, place, and time.      GCS: GCS eye subscore is 4. GCS verbal subscore is 5. GCS motor subscore is 6.   Psychiatric:         Mood and Affect: Mood normal.         Behavior: Behavior is cooperative.        Sedation Plan    ASA 3     Mallampati class: III.           NPO food since last night around 2200, patient has been drinking water all morning (approximately total 1L since midnight). Patient reports he was not told he cannot have water.     Last Recorded Vitals  Blood pressure 127/80, pulse 56, temperature 36 °C (96.8 °F), temperature source Temporal, resp. rate 18, height 1.956 m (6' 5\"), weight 103 kg (227 lb), " "SpO2 100%.         Vitals from the Past 24 Hours  Heart Rate:  [56-91]   Temp:  [36 °C (96.8 °F)-37.1 °C (98.7 °F)]   Resp:  [16-18]   BP: (112-150)/(61-98)   Height:  [195.6 cm (6' 5\")]   Weight:  [103 kg (227 lb)]   SpO2:  [84 %-100 %]          Relevant Results    Labs    CBC:   Recent Labs     09/19/24 0413 09/18/24  1526 07/25/18  1610   WBC 5.1 5.9 6.2   HGB 14.2 15.0 15.0   HCT 40.5* 44.0 42.1    192 203   MCV 90 91 89     BMP/CMP:   Recent Labs     09/19/24 0413 09/18/24  1526 04/29/21  0807 07/25/18  1610    139 141  --    K 3.8 4.0 4.8  --     107 107  --    BUN 17 17 18  --    CREATININE 1.01 0.97 1.10  --    CO2 24 22 27  --    CALCIUM 8.4* 8.9 9.7  --    PROT 5.4* 6.8  --  7.1   BILITOT 0.7 0.6  --  0.6   ALKPHOS 42 53  --  45   ALT 15 19  --  18   AST 15 17  --  16   GLUCOSE 103* 100* 88  --       Lipid Panel:   Recent Labs     09/19/24  0413 04/29/21  0807   CHOL 185 189   HDL 46.4 49.1   CHHDL 4.0 3.8   VLDL 20 26   TRIG 98 130   NHDL 139  --      Cardiac   Results from last 7 days   Lab Units 09/18/24  1657 09/18/24  1526   TROPHS ng/L 4 3      Hemoglobin A1C:   Recent Labs     04/29/21  0807   HGBA1C 4.6     TSH/ Free T4:   Recent Labs     09/18/24  1526   TSH 1.26     Iron: No results for input(s): \"FERRITIN\", \"TIBC\", \"IRONSAT\", \"BNP\" in the last 20059 hours.  Coag:     ABO: No results found for: \"ABO\"    Past Cardiology Tests (Last 3 Years):    EKG:  No results for input(s): \"ATRRATE\", \"VENTRATE\", \"PRINT\", \"QRSDUR\", \"QTCFRED\", \"QTCCALCB\" in the last 85459 hours.No results found for this or any previous visit (from the past 4464 hour(s)).  Echo:  Echocardiogram:   Transthoracic Echo (TTE) Limited With Doppler And Color 09/19/2024    PHYSICIAN INTERPRETATION:  Left Ventricle: Left ventricular ejection fraction is mildly decreased, calculated by Perez's biplane at 49%. The patient is in atrial fibrillation which may influence the estimate of left ventricular function and " transvalvular flows. There are no regional left ventricular wall motion abnormalities. The left ventricular cavity size is normal. Spectral Doppler shows a normal pattern of left ventricular diastolic filling.  Left Atrium: The left atrium is normal in size.  Right Ventricle: The right ventricle is normal in size. There is normal right ventricular global systolic function.  Right Atrium: The right atrium is normal in size.  Aortic Valve: The aortic valve is trileaflet. There is no evidence of aortic valve regurgitation.  Mitral Valve: The mitral valve is normal in structure. There is no evidence of mitral valve regurgitation.  Tricuspid Valve: The tricuspid valve is structurally normal. There is trace tricuspid regurgitation. The Doppler estimated RVSP is within normal limits at 19.2 mmHg.  Pulmonic Valve: The pulmonic valve is structurally normal. There is no indication of pulmonic valve regurgitation.  Pericardium: There is no pericardial effusion noted.  Aorta: The aortic root is normal.  In comparison to the previous echocardiogram(s): There are no prior studies on this patient for comparison purposes.      CONCLUSIONS:  1. Left ventricular ejection fraction is mildly decreased, calculated by Perez's biplane at 49%.  2. There is normal right ventricular global systolic function.  3. Right ventricular systolic pressure is within normal limits.  4. The patient is in atrial fibrillation which may influence the estimate of left ventricular function and transvalvular flows.      Ejection Fractions:  LV EF   Date/Time Value Ref Range Status   09/19/2024 10:25 AM 49 %      Cath:  Coronary Angiography: No results found for this or any previous visit from the past 1800 days.    Right Heart Cath: No results found for this or any previous visit from the past 1800 days.    Stress Test:  Nuclear:No results found for this or any previous visit from the past 1800 days.    Metabolic Stress: No results found for this or any  previous visit from the past 1800 days.    Cardiac Imaging:  Cardiac Scoring: No results found for this or any previous visit from the past 1800 days.    Cardiac MRI: No results found for this or any previous visit from the past 1800 days.         Assessment/Plan  Assessment/Plan   Principal Problem:    Atrial fibrillation (Multi)    atrial fibrillation  -LAKESHA with possible DCCV with Dr. Smith on 9/19/24  -anesthesia informed that patient has been drinking water all morning, recommended to wait 2 hours prior to the start of the procedure       NP discussed with Dr. Smith regarding plan of care/ discharge plan      I spent 30 minutes in the professional and overall care of this patient.      Julio César Ku, APRN-CNP, DNP

## 2024-09-19 NOTE — PROGRESS NOTES
Transitional Care Coordination Progress Note:  Plan per Medical/Surgical team: treatment of afib with heparin gtt, cardio consult, ECHO pending  Status: Observation  Payor source: Marcela  Discharge disposition: Home with wife   Potential Barriers: HR 77  hernia surgery 3wks ago   ADOD: 9/20/2024   MILTON Fernandez RN, BSN Transitional Care Coordinator ED# [phone]      09/19/24 0653   Discharge Planning   Living Arrangements Spouse/significant other   Support Systems Spouse/significant other   Assistance Needed cardio work up, anticoagulation plan   Type of Residence Private residence   Number of Stairs to Enter Residence 2   Number of Stairs Within Residence 15   Home or Post Acute Services None   Expected Discharge Disposition Home   Does the patient need discharge transport arranged? No   Financial Resource Strain   How hard is it for you to pay for the very basics like food, housing, medical care, and heating? Not hard   Housing Stability   In the last 12 months, was there a time when you were not able to pay the mortgage or rent on time? N   In the past 12 months, how many times have you moved where you were living? 1   At any time in the past 12 months, were you homeless or living in a shelter (including now)? N   Transportation Needs   In the past 12 months, has lack of transportation kept you from medical appointments or from getting medications? no   In the past 12 months, has lack of transportation kept you from meetings, work, or from getting things needed for daily living? No

## 2024-09-19 NOTE — POST-PROCEDURE NOTE
Physician Transition of Care Summary  Cardiovascular Lab    Procedure Date: 9/19/2024  Attending: Gomez Smith DO  Resident/Fellow/Other Assistant: none    Indications:   Persistent afib    Post-procedure diagnosis:   afib    Procedure(s):   Transesophageal echocardiogram and cardioversion    Procedure Findings:   No LA or JAQUELINE thrombus  Trivial MR  LVEF 45-50%  Full report to follow    Description of the Procedure:   LAKESHA probe inserted without difficulty  After confirmation of no LA thrombus a single synchronized shock was delivered at 200 J with successful conversion to sinus bradycardia    Complications:   none      Estimated Blood Loss:   none    Anesthesia: nurse anesthetist      Disposition:   Back to [facility] once post procedure protocols completed      Electronically signed by: Gomez Smith DO, 9/19/2024 1:13 PM

## 2024-09-19 NOTE — PROGRESS NOTES
Home with wife      09/19/24 0653   Current Planned Discharge Disposition   Current Planned Discharge Disposition Home

## 2024-09-19 NOTE — PROGRESS NOTES
09/19/24 0653   [facility] Disability Status   Are you deaf or do you have serious difficulty hearing? N   Are you blind or do you have serious difficulty seeing, even when wearing glasses? N   Because of a physical, mental, or emotional condition, do you have serious difficulty concentrating, remembering, or making decisions? (5 years old or older) N   Do you have serious difficulty walking or climbing stairs? N   Do you have serious difficulty dressing or bathing? N   Because of a physical, mental, or emotional condition, do you have serious difficulty doing errands alone such as visiting the doctor? N

## 2024-09-19 NOTE — ANESTHESIA POSTPROCEDURE EVALUATION
Patient: Steven Brice    Procedure Summary       Date: 09/19/24 Room / Location: [facility][note 3]; [facility][note 3]    Anesthesia Start: 1244 Anesthesia Stop: 1316    Procedure: TRANSESOPHAGEAL ECHO (LAKESHA) W/ POSSIBLE CARDIOVERSION Diagnosis:       Paroxysmal atrial fibrillation (Multi)      (Atrial fibrillation)    Scheduled Providers: Gomez Smith DO; Deven Hansen MD; JAYJAY John Responsible Provider: Deven Hansen MD    Anesthesia Type: MAC ASA Status: 3            Anesthesia Type: MAC    Vitals Value Taken Time             Pulse 64 09/19/24 1340   Resp 16 09/19/24 1340   SpO2 99 % 09/19/24 1318       Anesthesia Post Evaluation    Patient location during evaluation: PACU  Patient participation: complete - patient participated  Level of consciousness: awake and alert  Pain management: adequate  Airway patency: patent  Cardiovascular status: acceptable and hemodynamically stable  Respiratory status: acceptable, spontaneous ventilation and nonlabored ventilation  Hydration status: acceptable  Postoperative Nausea and Vomiting: none        There were no known notable events for this encounter.

## 2024-09-19 NOTE — PROGRESS NOTES
Steven Brice is a 59 y.o. male on day 0 of admission presenting with Atrial fibrillation (Multi).      Subjective   No acute events overnight. Patient states that he feels similarly to when his symptoms first started. He feels weak and somewhat lightheaded. Patient was seen by cardiology today was started on metoprolol        Objective     Last Recorded Vitals  /84 (BP Location: Right arm, Patient Position: Lying)   Pulse 80   Temp 36.3 °C (97.3 °F) (Temporal)   Resp 16   Wt 103 kg (227 lb)   SpO2 100%   Intake/Output last 3 Shifts:    Intake/Output Summary (Last 24 hours) at 9/19/2024 0833  Last data filed at 9/19/2024 0553  Gross per 24 hour   Intake 356.04 ml   Output --   Net 356.04 ml       Admission Weight  Weight: 103 kg (227 lb) (09/18/24 1458)    Daily Weight  09/18/24 : 103 kg (227 lb)    Image Results  XR chest 2 views  Narrative: STUDY:  Chest Radiographs;  9/18/2024 3:46PM  INDICATION:  Shortness of breath.  COMPARISON:  None available.  ACCESSION NUMBER(S):  [id number]  ORDERING CLINICIAN:  CANDACE JAMA  TECHNIQUE:  Frontal and lateral chest.   FINDINGS:  CARDIOMEDIASTINAL SILHOUETTE:  Cardiomediastinal silhouette is normal in size and configuration.     LUNGS:  Lungs are clear.     ABDOMEN:  No remarkable upper abdominal findings.     BONES:  No acute osseous changes.  Impression: No acute process.  Signed by Leonides Allen MD      Physical Exam  Constitutional:       Appearance: Normal appearance.   HENT:      Head: Normocephalic.      Nose: Nose normal.      Mouth/Throat:      Lips: Pink.      Mouth: Mucous membranes are moist.      Pharynx: Oropharynx is clear.   Eyes:      General: Lids are normal. Lids are everted, no foreign bodies appreciated. Vision grossly intact. Gaze aligned appropriately.      Extraocular Movements: Extraocular movements intact.      Conjunctiva/sclera: Conjunctivae normal.   Neck:      Trachea: Trachea normal.   Cardiovascular:      Rate and Rhythm:  Normal rate. Rhythm irregular.      Pulses: Normal pulses.      Heart sounds: Normal heart sounds.   Pulmonary:      Effort: Pulmonary effort is normal.      Breath sounds: Normal breath sounds.   Abdominal:      General: Abdomen is flat. Bowel sounds are normal.      Palpations: Abdomen is soft.   Musculoskeletal:         General: Normal range of motion.      Cervical back: Full passive range of motion without pain, normal range of motion and neck supple.   Feet:      Right foot:      Skin integrity: Skin integrity normal.      Left foot:      Skin integrity: Skin integrity normal.   Skin:     General: Skin is warm and dry.      Capillary Refill: Capillary refill takes less than 2 seconds.   Neurological:      General: No focal deficit present.      Mental Status: He is alert and oriented to person, place, and time. Mental status is at baseline.   Psychiatric:         Attention and Perception: Attention and perception normal.         Mood and Affect: Mood and affect normal.         Speech: Speech normal.         Behavior: Behavior normal. Behavior is cooperative.         Thought Content: Thought content normal.         Cognition and Memory: Cognition normal.         Judgment: Judgment normal.         Relevant Results               Assessment/Plan                  Assessment & Plan  Atrial fibrillation (Multi)      Steven Brice is a 59 year old male who presents emergency room with chief complaint of palpitations, lightheadedness in the setting of new A fib. Cardiology following started on metoprolol and eliquis. TTE pending. Will consider LAKESHA and cardioversion.        #New onset atrial fibrillation  Stable, symptomatic: palpations and lightheadedness  EKG: atrial fibrillation, 82 bpm  TSH: unremarkable. No infectious process at this time   -Continue Telemetry   - started eliquis   -LAKESHA pending   -cards following      F: bolus prn  E: replete prn  N: regular  GI: not indicated  DVT: eliquis   A: PIVs    FULL code  (confirmed on admission)           Judith Reno MD

## 2024-09-19 NOTE — NURSING NOTE
Received from cath lab . Denies c/o. Reminded of npo status until after midnight. Tele shows nsr 68. Daughter at bedside.

## 2024-09-20 ENCOUNTER — PATIENT OUTREACH (OUTPATIENT)
Dept: PRIMARY CARE | Facility: CLINIC | Age: 59
End: 2024-09-20
Payer: COMMERCIAL

## 2024-09-20 ASSESSMENT — ENCOUNTER SYMPTOMS
PALPITATIONS: 1
IRREGULAR HEARTBEAT: 1
PSYCHIATRIC NEGATIVE: 1
HEMATOLOGIC/LYMPHATIC NEGATIVE: 1
DYSPNEA ON EXERTION: 1
GASTROINTESTINAL NEGATIVE: 1
SHORTNESS OF BREATH: 1
MUSCULOSKELETAL NEGATIVE: 1
ENDOCRINE NEGATIVE: 1
EYES NEGATIVE: 1
CONSTITUTIONAL NEGATIVE: 1
NEUROLOGICAL NEGATIVE: 1
FREQUENCY: 1

## 2024-09-20 ASSESSMENT — CHA2DS2 SCORE
AGE IN YEARS: <65
SEX: MALE

## 2024-09-20 NOTE — PROGRESS NOTES
Current Outpatient Medications   Medication Instructions    apixaban (ELIQUIS) 5 mg, oral, Every 12 hours    metoprolol tartrate (LOPRESSOR) 25 mg, oral, 2 times daily    tamsulosin (FLOMAX) 0.4 mg, oral, Daily      Subjective   Steven Brice is a 59 y.o. male.    Chief Complaint:  Atrial Fibrillation and virtual appt    HPI    Patient presents for virtual appointment to discuss new onset A-fib.     PMHx / SMHx:  Atrial fibrillation (09/18/24),  GERD (gastroesophageal reflux disease), Gout, elevated PSA, prostate disease, pending biopsy 9/27/24, OA (osteoarthritis), calcified renal cyst,Vertigo and Left inguinal hernia laparosc. Repair on 8/28/24.    The patient presented to the ER on 09/18/24 with complaints of palpitations and dizziness.   He suddenly noticed his heart rate becoming irregular after particularly exercising in his elliptical on Sep 15, 2024. His heart rate remained faster and irregular since his presentation to [facility] ED on the 18th. At the time he had felt lightheaded, as if the irregular heartbeat was taking his breath away   He was found to be in A-fib, with no prior history of the AF or any cardiovascular disease. He was not anticoagulated.  CHADSVASc = 0    A LAKESHA revealed an EF of 40-45%, no LA / JAQUELINE thrombus and he underwent cardioversion.    His TTE showed a similar LVEF of 49%. He has not had a recent ischemic evaluation, though a CT calcium score in 2012 showed a total score of 4.91 in the RCA. The patient was discharged on Eliquis and metoprolol.    ECG: AF @ 82 bpm; QRSd 84 ms; Qtc 422 ms; NonSpec ST changes    He presents today for a virtual appointment to discuss his new-onset A-fib and treatment options, considering his mildly reduced ejection fraction.    TESTING    EF   Date/Time Value Ref Range Status   09/19/2024 01:23 PM 48 %      CONCLUSIONS:   1. Left ventricular ejection fraction is mildly decreased, calculated by Perez's biplane at 49%.   2. There is normal right  ventricular global systolic function.   3. Right ventricular systolic pressure is within normal limits.   4. The patient is in atrial fibrillation which may influence the estimate of left ventricular function and transvalvular flows.    Echo:  Transesophageal Echo (LAKESHA) With Possible Cardioversion 09/19/2024  CONCLUSIONS:   1. The left ventricular systolic function is mildly decreased, with a visually estimated ejection fraction of 45-50%.   2. Successful cardioversion for atrial fibrillation resulting in sinus bradycardia.   3. There is normal right ventricular global systolic function.   4. There is no evidence of a patent foramen ovale.   5. No LA or JAQUELINE thrombus.    Lab Review:   Lab Results   Component Value Date    WBC 5.1 09/19/2024    HGB 14.2 09/19/2024    HCT 40.5 (L) 09/19/2024    MCV 90 09/19/2024     09/19/2024     Lab Results   Component Value Date    CALCIUM 8.4 (L) 09/19/2024    PROT 5.4 (L) 09/19/2024    ALBUMIN 3.5 09/19/2024    AST 15 09/19/2024    ALT 15 09/19/2024    ALKPHOS 42 09/19/2024    BILITOT 0.7 09/19/2024     Lab Results   Component Value Date    CHOL 185 09/19/2024    TRIG 98 09/19/2024    HDL 46.4 09/19/2024    CHHDL 4.0 09/19/2024    LDLF 114 (H) 04/29/2021    VLDL 20 09/19/2024    NHDL 139 09/19/2024     Lab Results   Component Value Date    GLUCOSE 103 (H) 09/19/2024     09/19/2024    K 3.8 09/19/2024     09/19/2024    CO2 24 09/19/2024    ANIONGAP 11 09/19/2024    BUN 17 09/19/2024    CREATININE 1.01 09/19/2024    CALCIUM 8.4 (L) 09/19/2024    ALBUMIN 3.5 09/19/2024     Lab Results   Component Value Date    HGBA1C 5.1 09/19/2024       Review of Systems   Constitutional: Negative.   HENT: Negative.     Eyes: Negative.    Cardiovascular:  Positive for dyspnea on exertion, irregular heartbeat and palpitations.   Respiratory:  Positive for shortness of breath.    Endocrine: Negative.    Hematologic/Lymphatic: Negative.    Skin: Negative.    Musculoskeletal:  Negative.    Gastrointestinal: Negative.    Genitourinary:  Positive for frequency, incomplete emptying and nocturia.   Neurological: Negative.    Psychiatric/Behavioral: Negative.         Assessment/Plan     NEW ONSET OF SYMPTOMATIC, RATE CONTROLLED  AFIB. POSSIBLY PERSISTENT SINCE IT REQUIRED CARDIOVERSION. MILDLY DECREASED LVFx (LVEF 40-45%).    WE DISCUSSED THE RISKS AND BENEFITS OF ORAL ANTICOAGULATION POST CARDIOVERSION (CHADSVASc=0), BETA BLOCKER AND AADs FOR AFIB.  HE WILL CONTINUE ELIQUIS FOR 4 WEEKS AND METOPROLOL  I ASKED HIM TO F/U WITH ENEIDA MORALES MD FOR GENERAL CARDIOLOGY.  I WILL OBTAIN A 14 DAY HEART MONITOR EARLY OCT. 2024    I ASKED HIS HARJEET GABINO COVARRUBIAS IF Mr. BOYD UPCOMING PROSTATE BIOPSY COULD BE POSTPONED FOR 4 WEEKS DUE TO ANTICOAGULATION POST CARDIOVERSION. HARJEET AGREED AND WILL RESCHEDULE THE BIOPSY PROCEDURE.       MD Otis Bush Master Clinician of Cardiovascular [place].   [facility] [place].   Director of Electrophysiology Center  Professor of Medicine.   [employer].

## 2024-09-20 NOTE — PROGRESS NOTES
Discharge Facility: [facility]   Discharge Diagnosis: Atrial fibrillation (Multi)   Admission Date: 9/18/24  Discharge Date: 9/19/24    PCP Appointment Date: no appointment, message to office  Specialist Appointment Date: needs cardiology  Hospital Encounter and Summary Linked: Yes  See discharge assessment below for further details    Medications  Medications reviewed with patient/caregiver?: Yes (9/20/2024  9:26 AM)  Is the patient having any side effects they believe may be caused by any medication additions or changes?: No (9/20/2024  9:26 AM)  Does the patient have all medications ordered at discharge?: Yes (9/20/2024  9:26 AM)  Medication Comments: eliquis, metoprolol (9/20/2024  9:26 AM)    Appointments  Does the patient have a primary care provider?: Yes (9/20/2024  9:26 AM)  Care Management Interventions: Advised patient to make appointment (9/20/2024  9:26 AM)  Care Management Interventions: Advised to schedule with specialist (9/20/2024  9:26 AM)    Self Management  What is the home health agency?: n/a (9/20/2024  9:26 AM)  What Durable Medical Equipment (DME) was ordered?: n/a (9/20/2024  9:26 AM)    Patient Teaching  Does the patient have access to their discharge instructions?: Yes (9/20/2024  9:26 AM)  Care Management Interventions: Reviewed instructions with patient (9/20/2024  9:26 AM)  What is the patient's perception of their health status since discharge?: Improving (9/20/2024  9:26 AM)  Patient/Caregiver Education Comments: Patient reports doing well since discharge, slept for about 10 hours last night. Medications reviewed. Encouraged follow up appointments. Patient verbalizes understanding and denies any questions or concerns. (9/20/2024  9:26 AM)

## 2024-09-22 ENCOUNTER — PATIENT MESSAGE (OUTPATIENT)
Dept: UROLOGY | Facility: HOSPITAL | Age: 59
End: 2024-09-22
Payer: COMMERCIAL

## 2024-09-23 DIAGNOSIS — N40.1 BPH WITH OBSTRUCTION/LOWER URINARY TRACT SYMPTOMS: Primary | ICD-10-CM

## 2024-09-23 DIAGNOSIS — N13.8 BPH WITH OBSTRUCTION/LOWER URINARY TRACT SYMPTOMS: Primary | ICD-10-CM

## 2024-09-24 ENCOUNTER — PREP FOR PROCEDURE (OUTPATIENT)
Dept: UROLOGY | Facility: HOSPITAL | Age: 59
End: 2024-09-24
Payer: COMMERCIAL

## 2024-09-24 DIAGNOSIS — R97.20 ELEVATED PSA: Primary | ICD-10-CM

## 2024-09-24 RX ORDER — SODIUM CHLORIDE, SODIUM LACTATE, POTASSIUM CHLORIDE, CALCIUM CHLORIDE 600; 310; 30; 20 MG/100ML; MG/100ML; MG/100ML; MG/100ML
20 INJECTION, SOLUTION INTRAVENOUS CONTINUOUS
OUTPATIENT
Start: 2024-09-24

## 2024-09-24 RX ORDER — TAMSULOSIN HYDROCHLORIDE 0.4 MG/1
0.4 CAPSULE ORAL DAILY
Qty: 30 CAPSULE | Refills: 3 | Status: SHIPPED | OUTPATIENT
Start: 2024-09-24 | End: 2025-01-22

## 2024-09-28 LAB
ATRIAL RATE: 326 BPM
ATRIAL RATE: 45 BPM
P AXIS: 24 DEGREES
P OFFSET: 210 MS
P ONSET: 149 MS
PR INTERVAL: 150 MS
Q ONSET: 224 MS
Q ONSET: 226 MS
QRS COUNT: 7 BEATS
QRS COUNT: 9 BEATS
QRS DURATION: 86 MS
QRS DURATION: 90 MS
QT INTERVAL: 402 MS
QT INTERVAL: 422 MS
QTC CALCULATION(BAZETT): 365 MS
QTC CALCULATION(BAZETT): 373 MS
QTC FREDERICIA: 383 MS
QTC FREDERICIA: 383 MS
R AXIS: -22 DEGREES
R AXIS: -23 DEGREES
T AXIS: 14 DEGREES
T AXIS: 33 DEGREES
T OFFSET: 427 MS
T OFFSET: 435 MS
VENTRICULAR RATE: 45 BPM
VENTRICULAR RATE: 52 BPM

## 2024-09-30 DIAGNOSIS — I48.91 ATRIAL FIBRILLATION, UNSPECIFIED TYPE (MULTI): ICD-10-CM

## 2024-09-30 DIAGNOSIS — I48.0 PAROXYSMAL ATRIAL FIBRILLATION (MULTI): Primary | ICD-10-CM

## 2024-10-04 ENCOUNTER — PATIENT OUTREACH (OUTPATIENT)
Dept: PRIMARY CARE | Facility: CLINIC | Age: 59
End: 2024-10-04
Payer: COMMERCIAL

## 2024-10-07 ENCOUNTER — APPOINTMENT (OUTPATIENT)
Dept: PRIMARY CARE | Facility: CLINIC | Age: 59
End: 2024-10-07
Payer: COMMERCIAL

## 2024-10-08 ENCOUNTER — APPOINTMENT (OUTPATIENT)
Dept: CARDIOLOGY | Facility: CLINIC | Age: 59
End: 2024-10-08
Payer: COMMERCIAL

## 2024-10-08 ENCOUNTER — OFFICE VISIT (OUTPATIENT)
Dept: CARDIOLOGY | Facility: CLINIC | Age: 59
End: 2024-10-08
Payer: COMMERCIAL

## 2024-10-08 ENCOUNTER — HOSPITAL ENCOUNTER (OUTPATIENT)
Dept: CARDIOLOGY | Facility: HOSPITAL | Age: 59
Discharge: HOME | End: 2024-10-08
Payer: COMMERCIAL

## 2024-10-08 VITALS
BODY MASS INDEX: 26.8 KG/M2 | SYSTOLIC BLOOD PRESSURE: 115 MMHG | OXYGEN SATURATION: 99 % | WEIGHT: 226 LBS | DIASTOLIC BLOOD PRESSURE: 70 MMHG | HEART RATE: 43 BPM

## 2024-10-08 DIAGNOSIS — I48.91 ATRIAL FIBRILLATION, UNSPECIFIED TYPE (MULTI): ICD-10-CM

## 2024-10-08 PROCEDURE — 1036F TOBACCO NON-USER: CPT | Performed by: INTERNAL MEDICINE

## 2024-10-08 PROCEDURE — 93005 ELECTROCARDIOGRAM TRACING: CPT

## 2024-10-08 PROCEDURE — 99213 OFFICE O/P EST LOW 20 MIN: CPT | Performed by: INTERNAL MEDICINE

## 2024-10-08 ASSESSMENT — PAIN SCALES - GENERAL: PAINLEVEL: 0-NO PAIN

## 2024-10-08 NOTE — PROGRESS NOTES
Primary Care Physician: Lilliam Stallings MD  Date of Visit: 10/08/2024 11:20 AM EDT  Location of visit: [facility] [address]     Chief Complaint:   Hospital follow up       HPI / Summary:   Steven Brice is a 59 y.o. male with h/o GERD, hernia repair, BPH, afib s/p LAKESHA/DCC 9/19/24      Initial consult 9/2024:  59-year-old male with history of GERD with esophagitis on EGD 2021, hernia repair, BPH presents with several days of progressive dyspnea, dizziness and lightheadedness.  He had inguinal surgery a couple of weeks ago and since that time has had diminished mobilization and a general sense of deconditioning.  He started to increase activity about 5 days ago and noticed irregular pulse and rhythm as well as easy tachycardia with heart rates in the 140s with minimal activity and some occasional dizziness.  He thought initially might just be deconditioning after surgery.  He went to see physician who recommended he come to the ER for an EKG and further evaluation.  Upon arrival to the ER he was found to be in A-fib with heart rates in the 80s.     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  Admits to drinking 4-5 beverages on a regular basis per day  No tobacco  Regular marijuana use    Interval events:  Compliant with Eliquis and metoprolol.  He spoke with family friend Dr. Bean who also prescribed him flecainide just in case he goes back into A-fib.  Feels lightheaded dizzy and rundown since being on the new medicines.  Has some upcoming overseas travel.    Last Cardiology Tests:  ECG:  10/8/2024: Marked sinus bradycardia with HR 40 bpm, right superior axis    Echo:  LAKESHA 9/19/24:  1. The left ventricular systolic function is mildly decreased, with a visually estimated ejection fraction of 45-50%.   2. Successful cardioversion for atrial fibrillation resulting in sinus bradycardia.   3. There is normal right ventricular global systolic function.   4. There is no evidence of a  patent foramen ovale.   5. No LA or JAQUELINE thrombus.    Cath:      Stress Test:      Cardiac Imaging:    Past Medical History:  Past Medical History:   Diagnosis Date    Atrial fibrillation (Multi)     GERD (gastroesophageal reflux disease)     Gout     OA (osteoarthritis)     Vertigo         Past Surgical History:  Past Surgical History:   Procedure Laterality Date    COLONOSCOPY      HEMORRHOID SURGERY  06/27/2014    Hemorrhoidectomy    HERNIA REPAIR      x3 -- 1987 and 2010 and 8/28/2024    KNEE ARTHROSCOPY W/ DEBRIDEMENT            Social History:  He reports that he has never smoked. He has never been exposed to tobacco smoke. He has never used smokeless tobacco. He reports current alcohol use of about 15.0 standard drinks of alcohol per week. He reports current drug use. Frequency: 1.00 time per week. Drug: Other.    Family History:  family history includes Leukemia in his father; Stroke in his mother.      Allergies:  No Known Allergies    Outpatient Medications:  Current Outpatient Medications   Medication Instructions    apixaban (ELIQUIS) 5 mg, oral, Every 12 hours    flecainide (TAMBOCOR) 100 mg, oral, 2 times daily    metoprolol tartrate (LOPRESSOR) 25 mg, oral, 2 times daily    tamsulosin (FLOMAX) 0.4 mg, oral, Daily       Physical Exam:  GENERAL: alert, cooperative, pleasant, in no acute distress  SKIN: warm, dry, no rash.  NECK: no JVD, no JOSH  CARDIAC: Regular rate and rhythm with no rubs, murmurs, or gallops  CHEST: Normal respiratory efforts, lungs clear to auscultation bilaterally.  EXTREMITIES: no edema  NEURO: Alert and oriented x 3.  Grossly normal.  Moves all 4 extremities.    Vitals:    10/08/24 1126   BP: 115/70   BP Location: Left arm   Patient Position: Sitting   Pulse: (!) 43   SpO2: 99%   Weight: 103 kg (226 lb)     Wt Readings from Last 5 Encounters:   09/19/24 103 kg (227 lb)   09/10/24 103 kg (227 lb)   08/28/24 103 kg (227 lb 1.2 oz)   08/13/24 103 kg (227 lb)   07/23/24 103 kg (227 lb  12.8 oz)     Body mass index is 26.8 kg/m².        Last Labs:  CMP:  Recent Labs     09/19/24  0413 09/18/24  1526 04/29/21  0807    139 141   K 3.8 4.0 4.8    107 107   CO2 24 22 27   ANIONGAP 11 14 12   BUN 17 17 18   CREATININE 1.01 0.97 1.10   EGFR 85 89  --    GLUCOSE 103* 100* 88     Recent Labs     09/19/24  0413 09/18/24  1526 07/25/18  1610   ALBUMIN 3.5 4.3 4.5   ALKPHOS 42 53 45   ALT 15 19 18   AST 15 17 16   BILITOT 0.7 0.6 0.6     CBC:  Recent Labs     09/19/24  0413 09/18/24  1526 07/25/18  1610   WBC 5.1 5.9 6.2   HGB 14.2 15.0 15.0   HCT 40.5* 44.0 42.1    192 203   MCV 90 91 89     COAG:   Recent Labs     09/18/24  1526   DDIMERVTE 431     ENDO:  Recent Labs     09/19/24  0413 09/18/24  1526 04/29/21  0807   TSH  --  1.26  --    HGBA1C 5.1  --  4.6      CARDIAC:   Recent Labs     09/18/24  1657 09/18/24  1526   TROPHS 4 3     Recent Labs     09/19/24  0413 04/29/21  0807   CHOL 185 189   LDLF  --  114*   LDLCALC 119*  --    HDL 46.4 49.1   TRIG 98 130             Assessment/Plan   59 y.o. male with h/o GERD, hernia repair, BPH, afib s/p LAKESHA/DCC 9/19/24    Single episode of A-fib with RVR 9/2024 with symptomatic lightheadedness and dizziness and shortness of breath.  Probably triggered by hernia surgery and/or anesthesia.  Low [id number] of 0 and we will stop Eliquis after completing 30 days post cardioversion.  Severely bradycardic today with dizziness and we will discontinue metoprolol.  He has flecainide pill in pocket as needed.  2-week monitor ordered for burden as well as sleep study.  Okay for prostate biopsy after he has completed his 30 days of Eliquis.  I will plan to follow-up with him in about 6 months.           Followup Appts:  Future Appointments   Date Time Provider Department [place]   10/8/2024 12:20 PM [facility] [id number] ECG [id number] [facility]   10/10/2024 10:00 AM Lilliam Stallings MD [id number] [facility]   10/21/2024 11:00 AM Fredy Diallo MD [facility]   10/28/2024   2:00 PM [id number]   12/16/2024  9:20 AM MD [facility] [place]   4/8/2025  9:40 AM Gomez Smith DO [id number] [facility]           ____________________________________________________________  Gomez Smith DO  [facility] [place]  [facility]

## 2024-10-09 PROCEDURE — 93005 ELECTROCARDIOGRAM TRACING: CPT

## 2024-10-10 ENCOUNTER — OFFICE VISIT (OUTPATIENT)
Dept: PRIMARY CARE | Facility: CLINIC | Age: 59
End: 2024-10-10
Payer: COMMERCIAL

## 2024-10-10 VITALS
SYSTOLIC BLOOD PRESSURE: 100 MMHG | TEMPERATURE: 97.3 F | HEART RATE: 70 BPM | BODY MASS INDEX: 26.8 KG/M2 | DIASTOLIC BLOOD PRESSURE: 60 MMHG | WEIGHT: 227 LBS | RESPIRATION RATE: 16 BRPM | HEIGHT: 77 IN

## 2024-10-10 DIAGNOSIS — R97.20 ELEVATED PSA: ICD-10-CM

## 2024-10-10 DIAGNOSIS — I48.0 PAROXYSMAL ATRIAL FIBRILLATION (MULTI): Primary | ICD-10-CM

## 2024-10-10 PROCEDURE — 3008F BODY MASS INDEX DOCD: CPT | Performed by: INTERNAL MEDICINE

## 2024-10-10 PROCEDURE — 99214 OFFICE O/P EST MOD 30 MIN: CPT | Performed by: INTERNAL MEDICINE

## 2024-10-10 PROCEDURE — 1036F TOBACCO NON-USER: CPT | Performed by: INTERNAL MEDICINE

## 2024-10-10 NOTE — PROGRESS NOTES
Steven Brice is a 59 y.o. male   Patient with a past medical history of GERD, Esophagitis, Hearing loss, Osteoarthritis, Kidney stones/ BPH, Lumbar stenosis, Constipation, Tubular Adenoma (2029) history of atrial fibrillation RVR s/p cardioversion    09/18   Admitted from ED (Observation) 2118 09/19   Discharged 1753    Patient was doing well status post hernia repair of the inguinal region when 2 days postop he started having palpitations  Admitted to the hospital and noted to have atrial fibrillation RVR  Underwent a successful cardioversion  Started on metoprolol and Eliquis    Severely bradycardic today with dizziness and we will discontinue metoprolol.  He has flecainide pill in pocket as needed.  2-week monitor ordered for burden as well as sleep study.  Okay for prostate biopsy after he has completed his 30 days of Eliquis.         Review of Systems     Constitutional: no fever, no chills, not feeling poorly, not feeling tired and no recent weight gain, no recent weight loss.   ENT: no earache, no hearing loss, no nosebleeds, no nasal discharge, no sore throat and no hoarseness.   Cardiovascular: the heart rate was not slow, the heart rate was not fast, no chest pain, no palpitations, no intermittent leg claudication and no lower extremity edema.   Respiratory: no cough, wheezing or shortness of breath at rest or exertion  Gastrointestinal: no abdominal pain, no constipation, no melena, no nausea, no diarrhea, no vomiting and no blood in stools.   Musculoskeletal: no arthralgias, no myalgias, no back pain, no joint swelling, no joint stiffness, no limb pain and no limb swelling.   Integumentary: no skin rashes, no skin lesions, no itching, no skin wound and no dry skin.   Neurological: no headache, no confusion, no numbness, no dizziness, no tingling and no fainting.   All other systems have been reviewed and are negative for complaint.     Current Outpatient Medications   Medication Instructions     flecainide (TAMBOCOR) 100 mg, oral, 2 times daily    tamsulosin (FLOMAX) 0.4 mg, oral, Daily         There were no vitals filed for this visit.     Physical Exam     Constitutional   General appearance: Alert and in no acute distress.     Pulmonary   Respiratory assessment: No respiratory distress, normal respiratory rhythm and effort.    Auscultation of Lungs: Clear bilateral breath sounds.   Cardiovascular   Auscultation of heart: Apical pulse normal, heart rate and rhythm normal, normal S1 and S2, no murmurs and no pericardial rub.    Exam for edema: No peripheral edema.   Abdomen   Abdominal Exam: No bruits, normal bowel sounds, soft, non-tender, no abdominal mass palpated.    Liver and Spleen exam: No hepato-splenomegaly.   Musculoskeletal   Examination of gait: Normal.    Inspection of digits and nails: No clubbing or cyanosis of the fingernails.    Inspection/palpation of joints, bones and muscles: No joint swelling. Normal movement of all extremities.   Skin   Skin inspection: Normal skin color and pigmentation, normal skin turgor and no visible rash.   Neurologic   Cranial nerves: Nerves 2-12 were intact, no focal neuro defects.    Assessment/Plan          Patient with a past medical history of GERD, Esophagitis, Hearing loss, Osteoarthritis, Kidney stones/ BPH, Lumbar stenosis, Constipation, Tubular Adenoma (2029), history of atrial fibrillation s/p cardioversion    #Left inguinal hernia  S/p repair    #Atrial fibrillation RVR  S/p cardioversion currently normal sinus  On Eliquis for 30 days  Has a Holter monitor on and did buy an Apple Watch  Flecainide with pill in pocket  Had a reaction to beta-blockers with bradycardia and fatigue therefore stopped    #BPH with elevated PSA  MRI shows calcifications  Scheduled for biopsy        #GERD with esophagitis  #Hearing loss  Follows with gastroenterology

## 2024-10-17 ENCOUNTER — CLINICAL SUPPORT (OUTPATIENT)
Dept: PREADMISSION TESTING | Facility: HOSPITAL | Age: 59
End: 2024-10-17
Payer: COMMERCIAL

## 2024-10-17 DIAGNOSIS — R97.20 ELEVATED PSA: ICD-10-CM

## 2024-10-17 RX ORDER — ACETAMINOPHEN 500 MG
2000 TABLET ORAL DAILY
COMMUNITY

## 2024-10-21 ENCOUNTER — APPOINTMENT (OUTPATIENT)
Dept: UROLOGY | Facility: HOSPITAL | Age: 59
End: 2024-10-21
Payer: COMMERCIAL

## 2024-10-23 LAB
Q ONSET: 225 MS
QRS COUNT: 14 BEATS
QRS DURATION: 84 MS
QT INTERVAL: 362 MS
QTC CALCULATION(BAZETT): 422 MS
QTC FREDERICIA: 401 MS
R AXIS: 6 DEGREES
T AXIS: 65 DEGREES
T OFFSET: 406 MS
VENTRICULAR RATE: 82 BPM

## 2024-10-25 ENCOUNTER — DOCUMENTATION (OUTPATIENT)
Dept: PREADMISSION TESTING | Facility: HOSPITAL | Age: 59
End: 2024-10-25

## 2024-10-25 ENCOUNTER — PRE-ADMISSION TESTING (OUTPATIENT)
Dept: PREADMISSION TESTING | Facility: HOSPITAL | Age: 59
End: 2024-10-25
Payer: COMMERCIAL

## 2024-10-25 VITALS
HEART RATE: 69 BPM | RESPIRATION RATE: 16 BRPM | DIASTOLIC BLOOD PRESSURE: 73 MMHG | OXYGEN SATURATION: 100 % | BODY MASS INDEX: 25.51 KG/M2 | WEIGHT: 220.46 LBS | HEIGHT: 78 IN | TEMPERATURE: 98.6 F | SYSTOLIC BLOOD PRESSURE: 114 MMHG

## 2024-10-25 PROCEDURE — 99204 OFFICE O/P NEW MOD 45 MIN: CPT | Performed by: PHYSICIAN ASSISTANT

## 2024-10-25 ASSESSMENT — ENCOUNTER SYMPTOMS
RESPIRATORY NEGATIVE: 1
ENDOCRINE NEGATIVE: 1
CONSTITUTIONAL NEGATIVE: 1
MUSCULOSKELETAL NEGATIVE: 1
EYES NEGATIVE: 1
PSYCHIATRIC NEGATIVE: 1
ALLERGIC/IMMUNOLOGIC NEGATIVE: 1
NEUROLOGICAL NEGATIVE: 1
GASTROINTESTINAL NEGATIVE: 1
HEMATOLOGIC/LYMPHATIC NEGATIVE: 1

## 2024-10-25 NOTE — CPM/PAT H&P
[facility] Evaluation       Name: Steven Brice (Steven Brice)  /Age: 1965/59 y.o.     In-Person       Chief Complaint: Transperineal Prostate Biopsy     HPI    Date of Consult: 10/25/24    Referring Provider: Dr. Diallo     Surgery, Date, and Length: Transperineal Prostate Biopsy; 24; 40 minutes     Steven Brice  is a 59 year-old male who presents to the [facility] for perioperative risk assessment prior to surgery. Patient with known BPH. PSA elevated. MRI 24 1. 2.1 cm right posterolateral peripheral zone lesion at the apical gland (PI-RADS 5). Long capsular contact raises concern for microscopic extracapsular extension.  2. No pelvic lymphadenopathy is evident.  3. Changes associated with BPH within the transitional zone (PI-RADS  2).      This note was created in part upon personal review of patient's medical records.      Patient is scheduled to have Transperineal Prostate Biopsy     Medical History  Past Medical History:   Diagnosis Date    Atrial fibrillation (Multi)     s/p cardioversion 24 - continued medication    BPH (benign prostatic hyperplasia)     GERD (gastroesophageal reflux disease)     under control    Gout     HL (hearing loss)     left ear    Nephrolithiasis     OA (osteoarthritis)     Spinal stenosis     lumbar    Vertigo     mild symptoms        STOP BANG = 2  ( male,>51yo)    Caprini = 4  (age,surgery, BMI>25)    Surgical History  Past Surgical History:   Procedure Laterality Date    CARDIOVERSION  2024    COLONOSCOPY      HEMORRHOID SURGERY  2014    HERNIA REPAIR      x3 --  and  and 2024    KNEE ARTHROSCOPY W/ DEBRIDEMENT Left              Family history:  Family History   Problem Relation Name Age of Onset    Stroke Mother      Leukemia Father          Social history:  Social History     Socioeconomic History    Marital status:      Spouse name: Not on file    Number of children: Not on file    Years of education: Not on file     Highest education level: Not on file   Occupational History    Not on file   Tobacco Use    Smoking status: Never     Passive exposure: Never    Smokeless tobacco: Never   Vaping Use    Vaping status: Never Used   Substance and Sexual Activity    Alcohol use: Yes     Alcohol/week: 15.0 standard drinks of alcohol     Types: 5 Glasses of wine, 5 Cans of beer, 5 Shots of liquor per week     Comment: states 15-20 drinks a week per pt    Drug use: Yes     Frequency: 1.0 times per week     Types: Other, Marijuana     Comment: cannabis gummies per pt one time a week    Sexual activity: Defer   Other Topics Concern    Not on file   Social History Narrative    Not on file     Social Drivers of Health     Financial Resource Strain: Low Risk  (9/19/2024)    Overall Financial Resource Strain (CARDIA)     Difficulty of Paying Living Expenses: Not hard at all   Food Insecurity: Not on file   Transportation Needs: No Transportation Needs (9/19/2024)    PRAPARE - Transportation     Lack of Transportation (Medical): No     Lack of Transportation (Non-Medical): No   Physical Activity: Not on file   Stress: Not on file   Social Connections: Not on file   Intimate Partner Violence: Not on file   Housing Stability: Low Risk  (9/19/2024)    Housing Stability Vital Sign     Unable to Pay for Housing in the Last Year: No     Number of Times Moved in the Last Year: 1     Homeless in the Last Year: No          Current Outpatient Medications:     apixaban (Eliquis) 5 mg tablet, Take 1 tablet (5 mg) by mouth 2 times a day. Last dose 10/18/24, Disp: , Rfl:     cholecalciferol (Vitamin D3) 50 mcg (2,000 unit) capsule, Take 1 capsule (50 mcg) by mouth once daily., Disp: , Rfl:     multivitamin with minerals iron-free (Centrum Silver), Take 1 tablet by mouth once daily., Disp: , Rfl:     tamsulosin (Flomax) 0.4 mg 24 hr capsule, Take 1 capsule (0.4 mg) by mouth once daily., Disp: 30 capsule, Rfl: 3    DIETARY SUPPLEMENT ORAL, Take by mouth.  "PROBIOTICS WHEN TRAVELING (Patient not taking: Reported on 10/25/2024), Disp: , Rfl:     flecainide (Tambocor) 100 mg tablet, Take 1 tablet (100 mg) by mouth 2 times a day. (Patient not taking: Reported on 10/17/2024), Disp: 60 tablet, Rfl: 11         Visit Vitals  /73   Pulse 69   Temp 37 °C (98.6 °F)   Resp 16   Ht 1.969 m (6' 5.5\")   Wt 100 kg (220 lb 7.4 oz)   SpO2 100%   BMI 25.81 kg/m²   Smoking Status Never   BSA 2.34 m²        Review of Systems   Constitutional: Negative.    HENT:  Positive for hearing loss (left side 50%).    Eyes: Negative.         Glasses   Respiratory: Negative.     Cardiovascular:  Negative for leg swelling.        METS 4    Gastrointestinal: Negative.    Endocrine: Negative.    Genitourinary: Negative.    Musculoskeletal: Negative.    Skin: Negative.    Allergic/Immunologic: Negative.    Neurological: Negative.    Hematological: Negative.    Psychiatric/Behavioral: Negative.          Physical Exam  Constitutional:       Appearance: Normal appearance.   HENT:      Head: Normocephalic and atraumatic.      Right Ear: External ear normal.      Left Ear: External ear normal.      Nose: Nose normal.      Mouth/Throat:      Pharynx: Oropharynx is clear.   Eyes:      Conjunctiva/sclera: Conjunctivae normal.   Cardiovascular:      Rate and Rhythm: Normal rate and regular rhythm.      Heart sounds: Normal heart sounds.   Pulmonary:      Effort: Pulmonary effort is normal.      Breath sounds: Normal breath sounds.   Abdominal:      General: Abdomen is flat.      Palpations: Abdomen is soft.   Musculoskeletal:         General: Normal range of motion.      Cervical back: Normal range of motion and neck supple.   Skin:     General: Skin is warm and dry.   Neurological:      General: No focal deficit present.      Mental Status: He is alert and oriented to person, place, and time.   Psychiatric:         Mood and Affect: Mood normal.         Behavior: Behavior normal.         Thought Content: " Thought content normal.         Judgment: Judgment normal.          PAT AIRWAY:   Airway:     Mallampati::  II    Neck ROM::  Full    Lab Results   Component Value Date    WBC 5.1 09/19/2024    HGB 14.2 09/19/2024    HCT 40.5 (L) 09/19/2024    MCV 90 09/19/2024     09/19/2024      Lab Results   Component Value Date    GLUCOSE 103 (H) 09/19/2024    CALCIUM 8.4 (L) 09/19/2024     09/19/2024    K 3.8 09/19/2024    CO2 24 09/19/2024     09/19/2024    BUN 17 09/19/2024    CREATININE 1.01 09/19/2024      Lab Results   Component Value Date    ALT 15 09/19/2024    AST 15 09/19/2024    ALKPHOS 42 09/19/2024    BILITOT 0.7 09/19/2024      Encounter Date: 09/18/24   ECG 12 Lead   Result Value    Ventricular Rate 45    Atrial Rate 45    OH Interval 150    QRS Duration 90    QT Interval 422    QTC Calculation(Bazett) 365    P Axis 24    R Axis -22    T Axis 14    QRS Count 7    Q Onset 224    P Onset 149    P Offset 210    T Offset 435    QTC Fredericia 383    Narrative    Marked sinus bradycardia  Abnormal ECG  When compared with ECG of 19-SEP-2024 10:28, (unconfirmed)  Sinus rhythm has replaced Atrial fibrillation  Confirmed by Gomez Smith ([id number]) on 9/28/2024 2:17:49 PM      TTE 9/19/24  CONCLUSIONS:   1. The left ventricular systolic function is mildly decreased, with a visually estimated ejection fraction of 45-50%.   2. Successful cardioversion for atrial fibrillation resulting in sinus bradycardia.   3. There is normal right ventricular global systolic function.   4. There is no evidence of a patent foramen ovale.   5. No LA or JAQUELINE thrombus.    RCRI  0  , 3.9 % Risk of MACE    Cardiac  Dr. Smith 10/8/24 - Okay for prostate biopsy after he has completed his 30 days of Eliquis.      Hematology       Patient instructed to ambulate as soon as possible postoperatively to decrease thromboembolic risk.      Initiate mechanical DVT prophylaxis as soon as possible and initiate chemical prophylaxis when deemed  safe from a bleeding standpoint post surgery.       VTE prophylaxis per surgical team       Tests ordered in PAT:   LABS REVIEWED from 9/19: unremarkable     Risk assessment complete.  Patient is scheduled for a low surgical risk procedure.        Preoperative medication instructions were provided and reviewed with the patient.  Any additional testing or evaluation was explained to the patient.  Nothing by mouth instructions were discussed and patient's questions were answered prior to conclusion to this encounter.  Patient verbalized understanding of preoperative instructions given in preadmission testing; discharge instructions available in EMR.    This note was dictated by a speech recognition.  Minor errors may have been detected in a speech recognition.

## 2024-10-25 NOTE — PREPROCEDURE INSTRUCTIONS
Medication List            Accurate as of October 25, 2024  3:39 PM. Always use your most recent med list.                DIETARY SUPPLEMENT ORAL  Notes to patient: HOLD 7 Days prior to surgery - LD 10/31     Eliquis 5 mg tablet  Generic drug: apixaban  Notes to patient: No longer taking      flecainide 100 mg tablet  Commonly known as: Tambocor  Take 1 tablet (100 mg) by mouth 2 times a day.  Notes to patient: No longer taking on regular basis - Use if needed morning of surgery     multivitamin with minerals iron-free  Commonly known as: Centrum Silver  Notes to patient: HOLD 7 Days prior to surgery - LD 10/31     tamsulosin 0.4 mg 24 hr capsule  Commonly known as: Flomax  Take 1 capsule (0.4 mg) by mouth once daily.  Medication Adjustments for Surgery: Take/Use as prescribed     Vitamin D3 50 mcg (2,000 unit) capsule  Generic drug: cholecalciferol  Medication Adjustments for Surgery: Do Not take on the morning of surgery                 Concerning above medication instructions - If medication is normally taken at night continue normal schedule - do not take night prior and morning of surgery.     CONTACT SURGEON'S OFFICE IF YOU DEVELOP:  * Fever = 100.4 F   * New respiratory symptoms (e.g. cough, shortness of breath, respiratory distress, sore throat)  * Recent loss of taste or smell  *Flu like symptoms such as headache, fatigue or gastrointestinal symptoms  * You develop any open sores, shingles, burning or painful urination   AND/OR:  * You no longer wish to have the surgery.  * Any other personal circumstances change that may lead to the need to cancel or defer this surgery.  *You were admitted to any hospital within one week of your planned procedure.    SMOKING:  *Quitting smoking can make a huge difference to your health and recovery from surgery.    *If you need help with quitting, call 1-305-QUIT-NOW.    THE DAY BEFORE SURGERY:  *Do not eat any food after midnight the night before surgery/procedure.    *You are permitted to drink clear liquids (i.e. water, black coffee/tea, (no milk or cream) apple juice, and electrolyte drinks (Gatorade)13.5 ounces up to 2 hours before your instructed arrival time to the hospital.  *You may chew gum until  2 hours before your surgery/procedure.    SURGICAL TIME  *You will be contacted between 2 p.m. and 6 p.m. the business day before your surgery with your arrival time.  *If you haven't received a call by 6pm, call [phone].  *Scheduled surgery times may change and you will be notified if this occurs-check your personal voicemail for any updates.    ON THE MORNING OF SURGERY:  *Wear comfortable, loose fitting clothing.   *Do not use moisturizers, creams, lotions or perfume.  *All jewelry and valuables should be left at home.  *Prosthetic devices such as contact lenses, hearing aids, dentures, eyelash extensions, hairpins and body piercing must be removed before surgery.    BRING WITH YOU:  *Photo ID and insurance card  *Current list of medicines and allergies  *Pacemaker/Defibrillator/Heart stent cards  *CPAP machine and mask  *Slings/splints/crutches  *Copy of your complete Advanced Directive/DHPOA-if applicable  *Neurostimulator implant remote    PARKING AND ARRIVAL:  *Check in at the Main Entrance desk and let them know you are here for surgery.  *You will be directed to the 2nd floor surgical waiting area.    AFTER OUTPATIENT SURGERY:  *A responsible adult MUST accompany you at the time of discharge and stay with you for 24 hours after your surgery.  *You may NOT drive yourself home after surgery.  *You may use a taxi or ride sharing service ([employer], Uber) to return home ONLY if you are accompanied by a friend or family member.  *Instructions for resuming your medications will be provided by your surgeon.                                                            PAST MEDICAL HISTORY:  Benign Prostatic Hypertrophy     Dyslipidemia     Hypertension     Renal Insufficiency

## 2024-10-28 ENCOUNTER — HOSPITAL ENCOUNTER (OUTPATIENT)
Dept: CARDIOLOGY | Facility: HOSPITAL | Age: 59
Discharge: HOME | End: 2024-10-28
Payer: COMMERCIAL

## 2024-10-28 DIAGNOSIS — I48.0 PAROXYSMAL ATRIAL FIBRILLATION (MULTI): ICD-10-CM

## 2024-10-28 PROCEDURE — 93246 EXT ECG>7D<15D RECORDING: CPT

## 2024-11-01 ENCOUNTER — PATIENT OUTREACH (OUTPATIENT)
Dept: PRIMARY CARE | Facility: CLINIC | Age: 59
End: 2024-11-01
Payer: COMMERCIAL

## 2024-11-07 ASSESSMENT — ENCOUNTER SYMPTOMS
GASTROINTESTINAL NEGATIVE: 1
CARDIOVASCULAR NEGATIVE: 1
ENDOCRINE NEGATIVE: 1
CONSTITUTIONAL NEGATIVE: 1
RESPIRATORY NEGATIVE: 1
EYES NEGATIVE: 1

## 2024-11-08 ENCOUNTER — HOSPITAL ENCOUNTER (OUTPATIENT)
Facility: HOSPITAL | Age: 59
Setting detail: OUTPATIENT SURGERY
Discharge: HOME | End: 2024-11-08
Attending: STUDENT IN AN ORGANIZED HEALTH CARE EDUCATION/TRAINING PROGRAM | Admitting: STUDENT IN AN ORGANIZED HEALTH CARE EDUCATION/TRAINING PROGRAM
Payer: COMMERCIAL

## 2024-11-08 ENCOUNTER — ANESTHESIA EVENT (OUTPATIENT)
Dept: OPERATING ROOM | Facility: HOSPITAL | Age: 59
End: 2024-11-08
Payer: COMMERCIAL

## 2024-11-08 ENCOUNTER — ANESTHESIA (OUTPATIENT)
Dept: OPERATING ROOM | Facility: HOSPITAL | Age: 59
End: 2024-11-08
Payer: COMMERCIAL

## 2024-11-08 VITALS
DIASTOLIC BLOOD PRESSURE: 69 MMHG | HEART RATE: 50 BPM | SYSTOLIC BLOOD PRESSURE: 114 MMHG | BODY MASS INDEX: 26.81 KG/M2 | OXYGEN SATURATION: 98 % | HEIGHT: 77 IN | TEMPERATURE: 97.7 F | WEIGHT: 227.07 LBS | RESPIRATION RATE: 15 BRPM

## 2024-11-08 DIAGNOSIS — R97.20 ELEVATED PSA: ICD-10-CM

## 2024-11-08 PROCEDURE — 7100000001 HC RECOVERY ROOM TIME - INITIAL BASE CHARGE: Performed by: STUDENT IN AN ORGANIZED HEALTH CARE EDUCATION/TRAINING PROGRAM

## 2024-11-08 PROCEDURE — C1819 TISSUE LOCALIZATION-EXCISION: HCPCS | Performed by: STUDENT IN AN ORGANIZED HEALTH CARE EDUCATION/TRAINING PROGRAM

## 2024-11-08 PROCEDURE — A4649 SURGICAL SUPPLIES: HCPCS | Performed by: STUDENT IN AN ORGANIZED HEALTH CARE EDUCATION/TRAINING PROGRAM

## 2024-11-08 PROCEDURE — 7100000002 HC RECOVERY ROOM TIME - EACH INCREMENTAL 1 MINUTE: Performed by: STUDENT IN AN ORGANIZED HEALTH CARE EDUCATION/TRAINING PROGRAM

## 2024-11-08 PROCEDURE — 2500000004 HC RX 250 GENERAL PHARMACY W/ HCPCS (ALT 636 FOR OP/ED): Performed by: STUDENT IN AN ORGANIZED HEALTH CARE EDUCATION/TRAINING PROGRAM

## 2024-11-08 PROCEDURE — 7100000009 HC PHASE TWO TIME - INITIAL BASE CHARGE: Performed by: STUDENT IN AN ORGANIZED HEALTH CARE EDUCATION/TRAINING PROGRAM

## 2024-11-08 PROCEDURE — 76942 ECHO GUIDE FOR BIOPSY: CPT | Performed by: STUDENT IN AN ORGANIZED HEALTH CARE EDUCATION/TRAINING PROGRAM

## 2024-11-08 PROCEDURE — 7100000010 HC PHASE TWO TIME - EACH INCREMENTAL 1 MINUTE: Performed by: STUDENT IN AN ORGANIZED HEALTH CARE EDUCATION/TRAINING PROGRAM

## 2024-11-08 PROCEDURE — 55700 PR PROSTATE NEEDLE BIOPSY ANY APPROACH: CPT | Performed by: STUDENT IN AN ORGANIZED HEALTH CARE EDUCATION/TRAINING PROGRAM

## 2024-11-08 PROCEDURE — 3700000002 HC GENERAL ANESTHESIA TIME - EACH INCREMENTAL 1 MINUTE: Performed by: STUDENT IN AN ORGANIZED HEALTH CARE EDUCATION/TRAINING PROGRAM

## 2024-11-08 PROCEDURE — 2500000004 HC RX 250 GENERAL PHARMACY W/ HCPCS (ALT 636 FOR OP/ED): Performed by: ANESTHESIOLOGIST ASSISTANT

## 2024-11-08 PROCEDURE — 2720000007 HC OR 272 NO HCPCS: Performed by: STUDENT IN AN ORGANIZED HEALTH CARE EDUCATION/TRAINING PROGRAM

## 2024-11-08 PROCEDURE — 3600000007 HC OR TIME - EACH INCREMENTAL 1 MINUTE - PROCEDURE LEVEL TWO: Performed by: STUDENT IN AN ORGANIZED HEALTH CARE EDUCATION/TRAINING PROGRAM

## 2024-11-08 PROCEDURE — 3700000001 HC GENERAL ANESTHESIA TIME - INITIAL BASE CHARGE: Performed by: STUDENT IN AN ORGANIZED HEALTH CARE EDUCATION/TRAINING PROGRAM

## 2024-11-08 PROCEDURE — 3600000002 HC OR TIME - INITIAL BASE CHARGE - PROCEDURE LEVEL TWO: Performed by: STUDENT IN AN ORGANIZED HEALTH CARE EDUCATION/TRAINING PROGRAM

## 2024-11-08 RX ORDER — FENTANYL CITRATE 50 UG/ML
INJECTION, SOLUTION INTRAMUSCULAR; INTRAVENOUS AS NEEDED
Status: DISCONTINUED | OUTPATIENT
Start: 2024-11-08 | End: 2024-11-08

## 2024-11-08 RX ORDER — FENTANYL CITRATE 50 UG/ML
12.5 INJECTION, SOLUTION INTRAMUSCULAR; INTRAVENOUS EVERY 5 MIN PRN
Status: DISCONTINUED | OUTPATIENT
Start: 2024-11-08 | End: 2024-11-08 | Stop reason: HOSPADM

## 2024-11-08 RX ORDER — LIDOCAINE HYDROCHLORIDE 10 MG/ML
0.1 INJECTION, SOLUTION EPIDURAL; INFILTRATION; INTRACAUDAL; PERINEURAL ONCE
Status: DISCONTINUED | OUTPATIENT
Start: 2024-11-08 | End: 2024-11-08 | Stop reason: HOSPADM

## 2024-11-08 RX ORDER — PROPOFOL 10 MG/ML
INJECTION, EMULSION INTRAVENOUS AS NEEDED
Status: DISCONTINUED | OUTPATIENT
Start: 2024-11-08 | End: 2024-11-08

## 2024-11-08 RX ORDER — FENTANYL CITRATE 50 UG/ML
25 INJECTION, SOLUTION INTRAMUSCULAR; INTRAVENOUS EVERY 5 MIN PRN
Status: DISCONTINUED | OUTPATIENT
Start: 2024-11-08 | End: 2024-11-08 | Stop reason: HOSPADM

## 2024-11-08 RX ORDER — MIDAZOLAM HYDROCHLORIDE 1 MG/ML
INJECTION, SOLUTION INTRAMUSCULAR; INTRAVENOUS AS NEEDED
Status: DISCONTINUED | OUTPATIENT
Start: 2024-11-08 | End: 2024-11-08

## 2024-11-08 RX ORDER — SODIUM CHLORIDE, SODIUM LACTATE, POTASSIUM CHLORIDE, CALCIUM CHLORIDE 600; 310; 30; 20 MG/100ML; MG/100ML; MG/100ML; MG/100ML
100 INJECTION, SOLUTION INTRAVENOUS CONTINUOUS
Status: DISCONTINUED | OUTPATIENT
Start: 2024-11-08 | End: 2024-11-08 | Stop reason: HOSPADM

## 2024-11-08 RX ORDER — DROPERIDOL 2.5 MG/ML
0.62 INJECTION, SOLUTION INTRAMUSCULAR; INTRAVENOUS ONCE AS NEEDED
Status: DISCONTINUED | OUTPATIENT
Start: 2024-11-08 | End: 2024-11-08 | Stop reason: HOSPADM

## 2024-11-08 RX ORDER — SODIUM CHLORIDE, SODIUM LACTATE, POTASSIUM CHLORIDE, CALCIUM CHLORIDE 600; 310; 30; 20 MG/100ML; MG/100ML; MG/100ML; MG/100ML
20 INJECTION, SOLUTION INTRAVENOUS CONTINUOUS
Status: DISCONTINUED | OUTPATIENT
Start: 2024-11-08 | End: 2024-11-08 | Stop reason: HOSPADM

## 2024-11-08 RX ORDER — ONDANSETRON HYDROCHLORIDE 2 MG/ML
4 INJECTION, SOLUTION INTRAVENOUS ONCE AS NEEDED
Status: DISCONTINUED | OUTPATIENT
Start: 2024-11-08 | End: 2024-11-08 | Stop reason: HOSPADM

## 2024-11-08 RX ORDER — FENTANYL CITRATE 50 UG/ML
50 INJECTION, SOLUTION INTRAMUSCULAR; INTRAVENOUS EVERY 5 MIN PRN
Status: DISCONTINUED | OUTPATIENT
Start: 2024-11-08 | End: 2024-11-08 | Stop reason: HOSPADM

## 2024-11-08 ASSESSMENT — PAIN SCALES - GENERAL
PAINLEVEL_OUTOF10: 0 - NO PAIN

## 2024-11-08 ASSESSMENT — COLUMBIA-SUICIDE SEVERITY RATING SCALE - C-SSRS
6. HAVE YOU EVER DONE ANYTHING, STARTED TO DO ANYTHING, OR PREPARED TO DO ANYTHING TO END YOUR LIFE?: NO
2. HAVE YOU ACTUALLY HAD ANY THOUGHTS OF KILLING YOURSELF?: NO
6. HAVE YOU EVER DONE ANYTHING, STARTED TO DO ANYTHING, OR PREPARED TO DO ANYTHING TO END YOUR LIFE?: NO
1. IN THE PAST MONTH, HAVE YOU WISHED YOU WERE DEAD OR WISHED YOU COULD GO TO SLEEP AND NOT WAKE UP?: NO
1. IN THE PAST MONTH, HAVE YOU WISHED YOU WERE DEAD OR WISHED YOU COULD GO TO SLEEP AND NOT WAKE UP?: NO
2. HAVE YOU ACTUALLY HAD ANY THOUGHTS OF KILLING YOURSELF?: NO

## 2024-11-08 ASSESSMENT — PAIN - FUNCTIONAL ASSESSMENT
PAIN_FUNCTIONAL_ASSESSMENT: 0-10

## 2024-11-08 NOTE — PERIOPERATIVE NURSING NOTE
0830 Pt to bay 7. Pt wife back to bay 7. Discharge instructions provided to pt and family. Educated on medications, effects of anesthesia, and homecoming care. Pt and family verbalizing understanding of all instructions provided at this time. All questions and concerns answered. Pt given contact information for provider.     7021 Pt assisted with dressing with help of family. IV removed dressing applied.

## 2024-11-08 NOTE — OP NOTE
Transperineal Prostate Biopsy Operative Note     Date: 2024  OR Location: [facility] A OR    Name: Steven Brice, : 1965, Age: 59 y.o., MRN: [id number], Sex: male    Diagnosis  Pre-op Diagnosis      * Elevated PSA [R97.20] Post-op Diagnosis     * Elevated PSA [R97.20]     Procedures  Transperineal Prostate Biopsy  56013 - IL PROSTATE NEEDLE BIOPSY ANY APPROACH    IL BIOPSY OF PROSTATE,NEEDLE,TRANSPERINEAL [Y00134]  CHG US TRANSRECTAL [[id number]]  Surgeons      * Fredy Diallo - Primary    Resident/Fellow/Other Assistant:  Surgeons and Role:  * No surgeons found with a matching role *    Staff:   Circulator: Elvira Doherty Person: Shanann    Anesthesia Staff: Anesthesiologist: Denny Hernández MD  C-AA: JAYJAY Gay    Procedure Summary  Anesthesia: Anesthesia type not filed in the log.  ASA: ASA status not filed in the log.  Estimated Blood Loss: 10 mL  Intra-op Medications:   Administrations occurring from 730 to 0810 on 24:   Medication Name Total Dose   BUPivacaine HCl (Marcaine) 0.5 % (5 mg/mL) 8 mL, lidocaine (Xylocaine) 8 mL, sodium bicarbonate 4 mEq syringe 20 mL   fentaNYL PF 0.05 mg/mL 100 mcg   midazolam (Versed) 1 mg/1 mL 2 mg   propofol (Diprivan) injection 10 mg/mL 150 mg              Anesthesia Record               Intraprocedure I/O Totals       None           Specimen:   ID Type Source Tests Collected by Time   1 : Left Paramedian [place] Tissue PROSTATE NEEDLE BIOPSY LEFT SURGICAL PATHOLOGY EXAM Fredy Diallo MD 2024   2 : SANTA #1 Tissue PROSTATE BIOPSY TARGETED SANTA SURGICAL PATHOLOGY EXAM Fredy Diallo MD 2024   3 : Left Paramedian Base Tissue PROSTATE NEEDLE BIOPSY LEFT SURGICAL PATHOLOGY EXAM Fredy Diallo MD 2024   4 : Left Posterior [place] Tissue PROSTATE NEEDLE BIOPSY LEFT SURGICAL PATHOLOGY EXAM Fredy Diallo MD 2024   5 : Left Posterior Base Tissue PROSTATE NEEDLE BIOPSY LEFT SURGICAL PATHOLOGY EXAM Fredy Diallo MD 2024   6  : Left Lateral Tissue PROSTATE NEEDLE BIOPSY LEFT SURGICAL PATHOLOGY EXAM Fredy Diallo MD 11/8/2024 0718   7 : Left Anterior Tissue PROSTATE NEEDLE BIOPSY LEFT SURGICAL PATHOLOGY EXAM Fredy Diallo MD 11/8/2024 0718   8 : Right Anterior Tissue PROSTATE NEEDLE BIOPSY RIGHT SURGICAL PATHOLOGY EXAM Fredy Diallo MD 11/8/2024 0718   9 : Right Lateral Tissue PROSTATE NEEDLE BIOPSY RIGHT SURGICAL PATHOLOGY EXAM Fredy Diallo MD 11/8/2024 0718   10 : Right Paramedian [place] Tissue PROSTATE NEEDLE BIOPSY RIGHT SURGICAL PATHOLOGY EXAM Fredy Diallo MD 11/8/2024 0718   11 : Right Paramedian Base Tissue PROSTATE NEEDLE BIOPSY RIGHT SURGICAL PATHOLOGY EXAM Fredy Diallo MD 11/8/2024 0718   12 : Right Posterior [place] Tissue PROSTATE NEEDLE BIOPSY RIGHT SURGICAL PATHOLOGY EXAM Fredy Diallo MD 11/8/2024 0718   13 : Right Posterior Base Tissue PROSTATE NEEDLE BIOPSY RIGHT SURGICAL PATHOLOGY EXAM Fredy Diallo MD 11/8/2024 0718      Procedure Details: Preoperative Diagnosis: Elevated PSA; abnormal prostate findings on MRI;     Postoperative Diagnosis: Same    Operation Performed: MR/TRUS fusion guided biopsy via transperineal approach    Attending: Yoel    Assistant(s):     Anesthesia: Sedation and Local    Preparation: Betadine    EBL: Minimal     Complications: None     Indications for procedure: This 59 y.o. year old male presents with a history of Abnormal MRI.    MRI Findings: IMPRESSION:  1. 2.1 cm right posterolateral peripheral zone lesion at the apical  gland (PI-RADS 5). Long capsular contact raises concern for  microscopic extracapsular extension.  2. No pelvic lymphadenopathy is evident.  3. Changes associated with BPH within the transitional zone (PI-RADS    Procedure and Findings:     The patient was seen in the preoperative area. The risks, benefits, complications, treatment options, non-operative alternatives, expected recovery and outcomes were discussed with the patient. The possibilities of reaction to  medication, pulmonary aspiration, injury to surrounding structures, bleeding, recurrent infection, the need for additional procedures, failure to diagnose a condition, and creating a complication requiring transfusion or operation were discussed with the patient. The patient concurred with the proposed plan, giving informed consent.  The site of surgery was properly noted/marked if necessary per policy. The patient has been actively warmed in preoperative area. Preoperative antibiotics are not indicated. Venous thrombosis prophylaxis are not indicated.    The rational for transrectal ultrasound and biopsy of the prostate including risk, benefits and alternatives were discussed. These included risk of increased urination, urinary retention, hematuria, hematospermia, and urosepsis, the patient elected to proceed.      The multiparametric MRI data was imported from the radiology PACS system to the Uro[employer] biopsy platform. The T2-weighted images were reviewed including the segmented prostate boundary and pre-identified suspicious lesions (ROIs).     A procedure time out confirmed the proper patient, and the procedure informed consent form had been signed by the patient.    The patient was placed in lithotomy position. A injection mixture of lidocaine, marcaine and sodium bicarbonate was used as local anesthetic for the skin of the perineum and to perform a periapical block.     The electromagnetic sensor was attached to the ultrasound probe. The ultrasound transducer was placed into the rectum. Positioning of the probe and sensor within the generated EM-field was confirmed.     A comprehensive TRUS survey was performed with the prostate visualized in both the transverse and sagittal planes. There were benign calcifications seen on ultrasound. In addition to the MRI fusion, there were not hypoechoic lesions suspicious for tumor identified on ultrasound.     3D-Rendering with trus image processing and fusion:    In the  axial plane, an ultrasound sweep of the prostate was completed from base to apex. The serial axial image slices were marked by annotating the anterior, posterior, left, right, base and apical points for semi-automatic segmentation of the prostate. Manual adjustments of the prostate US segmentation was performed in the axial, sagittal and coronal views rendering a 3-D data set/US volume.     Initial rotational co-registration was performed by blending MR images that were overlaid on the US images. The urethra, bladder neck, bladder and posterior surface of the prostate were identified on both MRI and US.     The images were rotated to ensure corresponding anatomy was correctly aligned.     Elastic registration was then calculated for use if required.     The MRI and Ultrasound were then registered using co-display of the images verifying that base, apex, left and right sides of the prostate were correctly aligned. Manual corrections were performed where need. Additional co-registration of the internal fiducials including prostatic cyst and the pubis were performed.     Prostate Biopsy:     At this point, accurate co-registration/fusion was confirmed. Ultrasound was used to navigate to the centroid target and lesion volume. The segmented ROIs were targeted and biopsied with ultrasound guidance taking four cores from each target. I was satisfied with the location of the targeted biopsies obtained. An additional 12 core, standard, systematic random biopsy was performed for a total of 16 cores of tissue obtained during this biopsy session.      Disposition  Patient tolerated the procedure well and will follow-up for an outpatient appointment to discuss pathology.      Complications:  None; patient tolerated the procedure well.    Disposition: PACU - hemodynamically stable.  Condition: stable     Attending Attestation: I was present and scrubbed for the entire procedure.    Fredy Diallo  Phone Number: [phone]

## 2024-11-08 NOTE — ANESTHESIA PREPROCEDURE EVALUATION
Patient: Steven Brice    Procedure Information       Anesthesia Start Date/Time: 11/08/24 0728    Procedure: Transperineal Prostate Biopsy (Prostate)    Location: [facility] OR 02 / [facility] OR    Surgeons: Fredy Diallo MD            Relevant Problems   Cardiac   (+) Atrial fibrillation (Multi)   (+) Mitral valve disease      GI   (+) Esophageal dysphagia   (+) Esophageal reflux      Musculoskeletal   (+) Chronic neck pain   (+) Osteoarthritis   (+) Primary localized osteoarthrosis of left lower leg      HEENT   (+) Asymmetrical sensorineural hearing loss   (+) Bilateral sensorineural hearing loss   (+) Chronic sinusitis, unspecified   (+) Hearing loss      ID   (+) Blastocystis hominis infection   (+) COVID-19   (+) Travelers' diarrhea       Clinical information reviewed:    Allergies  Meds               NPO Detail:  NPO/Void Status  Date of Last Liquid: 11/08/24  Time of Last Liquid: 0430  Date of Last Solid: 11/07/24  Time of Last Solid: 2100         Physical Exam    Airway  Mallampati: II  TM distance: >3 FB  Neck ROM: full     Cardiovascular    Dental    Pulmonary    Abdominal        Anesthesia Plan    History of general anesthesia?: yes  History of complications of general anesthesia?: no    ASA 2     MAC     intravenous induction   Anesthetic plan and risks discussed with patient and spouse.

## 2024-11-08 NOTE — ANESTHESIA POSTPROCEDURE EVALUATION
Patient: Steven Brice    Procedure Summary       Date: 11/08/24 Room / Location: [facility] OR 02 / [facility] OR    Anesthesia Start: 0728 Anesthesia Stop: 0802    Procedure: Transperineal Prostate Biopsy (Prostate) Diagnosis:       Elevated PSA      (Elevated PSA [R97.20])    Surgeons: Fredy Diallo MD Responsible Provider: Denny Hernández MD    Anesthesia Type: MAC ASA Status: 2            Anesthesia Type: MAC    Vitals Value Taken Time   /63 11/08/24 0802   Temp 36.8 °C (98.2 °F) 11/08/24 0758   Pulse 52 11/08/24 0812   Resp 14 11/08/24 0800   SpO2 96 % 11/08/24 0815   Vitals shown include unfiled device data.    Anesthesia Post Evaluation    Patient location during evaluation: PACU  Patient participation: complete - patient participated  Level of consciousness: awake and alert  Pain management: adequate  Airway patency: patent  Cardiovascular status: acceptable and stable  Respiratory status: acceptable and room air  Hydration status: acceptable  Postoperative Nausea and Vomiting: none    No notable events documented.

## 2024-11-08 NOTE — DISCHARGE INSTRUCTIONS
DEPARTMENT OF UROLOGY  DISCHARGE INSTRUCTIONS -- Prostate Biopsy  Outpatient Surgery    C O N F I D E N T I A L   I N F O R M A T I O N    Steven Brice        Call [phone] during regular daytime business hours (8:00 am - 5:00 pm) and after 5:00 pm and ask for the Urology Resident with any questions or concerns.      If it is a life-threatening situation, proceed to the nearest emergency department.        Follow-up appointment:  12/16/24     Thank you for the opportunity to care for you today.  Your health and healing are very important to us.  We hope we made you feel as comfortable as possible and are committed to your recovery and continued well-being.      The following is a brief overview of your prostate biopsy today. Some of the information contained on this summary may be confidential.  This information should be kept in your records and should be shared with your regular doctor.    Physicians:   Dr. Diallo      Procedure performed: prostate biopsy  Pending results:   prostate pathology results    What to Expect During your Recovery and Home Care  Anesthesia Side Effects   You received anesthesia today.  You may feel sleepy, tired, or have a sore throat.   You may also feel drowsiness, dizziness, or inability to think clearly.  For your safety, do not drive, drink alcoholic beverages, take any unprescribed medication or make any important decisions for 24 hours.  A responsible adult should be with you for 24 hours.        Activity and Recovery    No heavy lifting or strenuous activity for several days. Avoid activities that put pressure on your rectal area. Do not have sex for a few days.      Pain Control  Unfortunately, you may experience pain after your procedure.  Adequate management can include alternative measures to help ease your pain and can include ice packs, and over the counter Tylenol can be taken as prescribed as needed for breakthrough pain. Do not take more then 4,000mg of Tylenol in  a 24-hour period.      Nausea/Vomiting   Clear liquids are best tolerated at first. Start slow, advance your diet as tolerated to normal foods. Avoid spicy, greasy, heavy foods at first. Also, you may feel nauseous or like you need to vomit if you take any type of medication on an empty stomach.  Call your physician if you are unable to eat or drink and have persistent vomiting.    Signs of Bleeding   Minor bleeding or drainage may occur from your rectum however, excessive or consistent bleeding should be reported to your surgeon. Excessive bleeding is defined as blood that is dripping from rectum, soaking through your pants, and is ketchup colored, thick with possible blood clots.  Consistent is defined as bleeding that does not stop. You may have blood in your poop, urine and semen for several weeks.     Treatment/wound care:   It is okay to shower 24 hours after time of surgery.      Signs of Infection  Signs of infection can include fever, burning sensation with urination, frequency, urgency or severe pain.  If you see any of these occur, please contact your doctor's office at [phone].  Any fever higher than 100.4, especially if associated with an ill feeling, abdominal pain, chills, or nausea should be reported to your surgeon.      Assist in bowel movements/urination  Increase fiber in diet  Urination should occur within 6 hours of anesthesia.   Increase water (6 to 8 glasses)  Increase walking   If you have tried these methods and your bladder still feels full and you cannot use the bathroom, please go to your nearest Emergency room.    Additional Instructions:   Try not to strain when going to the bathroom. Do not hold your urine. We will go over your biopsy results at your follow up appointment. It takes 7-10 business days for the results to come back.

## 2024-11-08 NOTE — H&P
History Of Present Illness  Steven Brice is a 59 y.o. male presenting with PIRADS 5 lesion on MRI and presents for fusion biopsy.     Past Medical History  Past Medical History:   Diagnosis Date    Atrial fibrillation (Multi)     s/p cardioversion 9/19/24 - continued medication    BPH (benign prostatic hyperplasia)     GERD (gastroesophageal reflux disease)     under control    Gout     HL (hearing loss)     left ear    Nephrolithiasis     OA (osteoarthritis)     Spinal stenosis     lumbar    Vertigo     mild symptoms       Surgical History  Past Surgical History:   Procedure Laterality Date    CARDIOVERSION  09/19/2024    COLONOSCOPY      HEMORRHOID SURGERY  06/27/2014    HERNIA REPAIR      x3 -- 1987 and 2010 and 8/28/2024    KNEE ARTHROSCOPY W/ DEBRIDEMENT Left         Social History  He reports that he has never smoked. He has never been exposed to tobacco smoke. He has never used smokeless tobacco. He reports current alcohol use of about 15.0 standard drinks of alcohol per week. He reports current drug use. Frequency: 1.00 time per week. Drugs: Other and Marijuana.    Family History  Family History   Problem Relation Name Age of Onset    Stroke Mother      Leukemia Father          Allergies  Patient has no known allergies.    Review of Systems   Constitutional: Negative.    Eyes: Negative.    Respiratory: Negative.     Cardiovascular: Negative.    Gastrointestinal: Negative.    Endocrine: Negative.    Genitourinary: Negative.    All other systems reviewed and are negative.       Physical Exam  Vitals reviewed.   Constitutional:       Appearance: Normal appearance.   HENT:      Head: Normocephalic and atraumatic.   Eyes:      Extraocular Movements: Extraocular movements intact.   Cardiovascular:      Rate and Rhythm: Normal rate and regular rhythm.   Pulmonary:      Effort: Pulmonary effort is normal.      Breath sounds: Normal breath sounds.   Abdominal:      General: There is no distension.       Palpations: Abdomen is soft.      Tenderness: There is no abdominal tenderness.   Musculoskeletal:         General: Normal range of motion.      Cervical back: Normal range of motion and neck supple.   Skin:     General: Skin is warm and dry.   Neurological:      General: No focal deficit present.      Mental Status: He is alert and oriented to person, place, and time.   Psychiatric:         Mood and Affect: Mood normal.         Behavior: Behavior normal.          Last Recorded Vitals  There were no vitals taken for this visit.    Relevant Results             Assessment/Plan   Assessment & Plan  Elevated PSA    Proceed to OR for fusion biopsy    Fredy Diallo MD

## 2024-11-12 ASSESSMENT — PAIN SCALES - GENERAL: PAINLEVEL_OUTOF10: 0 - NO PAIN

## 2024-11-14 LAB
LAB AP BLOCK FOR ADDITIONAL STUDIES: NORMAL
LABORATORY COMMENT REPORT: NORMAL
PATH REPORT.FINAL DX SPEC: NORMAL
PATH REPORT.GROSS SPEC: NORMAL
PATH REPORT.RELEVANT HX SPEC: NORMAL
PATH REPORT.TOTAL CANCER: NORMAL
RESIDENT REVIEW: NORMAL

## 2024-11-21 ENCOUNTER — OFFICE VISIT (OUTPATIENT)
Dept: UROLOGY | Facility: CLINIC | Age: 59
End: 2024-11-21
Payer: COMMERCIAL

## 2024-11-21 VITALS
SYSTOLIC BLOOD PRESSURE: 124 MMHG | BODY MASS INDEX: 27.56 KG/M2 | HEIGHT: 77 IN | HEART RATE: 67 BPM | DIASTOLIC BLOOD PRESSURE: 75 MMHG | WEIGHT: 233.4 LBS | RESPIRATION RATE: 16 BRPM | TEMPERATURE: 96.9 F | OXYGEN SATURATION: 99 %

## 2024-11-21 DIAGNOSIS — C61 MALIGNANT NEOPLASM OF PROSTATE (MULTI): Primary | ICD-10-CM

## 2024-11-21 DIAGNOSIS — N40.1 BPH WITH OBSTRUCTION/LOWER URINARY TRACT SYMPTOMS: ICD-10-CM

## 2024-11-21 DIAGNOSIS — N13.8 BPH WITH OBSTRUCTION/LOWER URINARY TRACT SYMPTOMS: ICD-10-CM

## 2024-11-21 PROCEDURE — 51798 US URINE CAPACITY MEASURE: CPT | Performed by: STUDENT IN AN ORGANIZED HEALTH CARE EDUCATION/TRAINING PROGRAM

## 2024-11-21 PROCEDURE — 99215 OFFICE O/P EST HI 40 MIN: CPT | Performed by: STUDENT IN AN ORGANIZED HEALTH CARE EDUCATION/TRAINING PROGRAM

## 2024-11-21 PROCEDURE — 3008F BODY MASS INDEX DOCD: CPT | Performed by: STUDENT IN AN ORGANIZED HEALTH CARE EDUCATION/TRAINING PROGRAM

## 2024-11-21 RX ORDER — TAMSULOSIN HYDROCHLORIDE 0.4 MG/1
0.8 CAPSULE ORAL DAILY
Qty: 90 CAPSULE | Refills: 3 | Status: SHIPPED | OUTPATIENT
Start: 2024-11-21 | End: 2025-05-20

## 2024-11-21 ASSESSMENT — PAIN SCALES - GENERAL: PAINLEVEL_OUTOF10: 0-NO PAIN

## 2024-11-21 NOTE — PROGRESS NOTES
UROLOGIC FOLLOW-UP VISIT     PROBLEM LIST:  1. Malignant neoplasm of prostate (Multi)  Referral to Radiation Oncology      2. BPH with obstruction/lower urinary tract symptoms  Measure post void residual        HISTORY OF PRESENT ILLNESS:   Steven Brice is a 59 y.o. male  who has a history of elevated PSA and PIRADS 5 lesion on MRI and is s/p fusion biopsy. He presents today for the follow up to discuss his pathological results.     He reports having hematuria, which he noted couple of days ago and has since subsided. His PVR today is 157 ml. He denies any fevers, chills, nausea, vomiting.    PAST MEDICAL HISTORY:  Past Medical History:   Diagnosis Date    Atrial fibrillation (Multi)     s/p cardioversion 9/19/24 - continued medication    BPH (benign prostatic hyperplasia)     GERD (gastroesophageal reflux disease)     under control    Gout     HL (hearing loss)     left ear    Nephrolithiasis     OA (osteoarthritis)     Spinal stenosis     lumbar    Vertigo     mild symptoms       PAST SURGICAL HISTORY:  Past Surgical History:   Procedure Laterality Date    CARDIOVERSION  09/19/2024    COLONOSCOPY      HEMORRHOID SURGERY  06/27/2014    HERNIA REPAIR      x3 -- 1987 and 2010 and 8/28/2024    KNEE ARTHROSCOPY W/ DEBRIDEMENT Left         ALLERGIES:   No Known Allergies     MEDICATIONS:     Current Outpatient Medications:     cholecalciferol (Vitamin D3) 50 mcg (2,000 unit) capsule, Take 1 capsule (50 mcg) by mouth once daily., Disp: , Rfl:     DIETARY SUPPLEMENT ORAL, Take by mouth. PROBIOTICS WHEN TRAVELING, Disp: , Rfl:     flecainide (Tambocor) 100 mg tablet, Take 1 tablet (100 mg) by mouth 2 times a day. (Patient not taking: Reported on 10/8/2024), Disp: 60 tablet, Rfl: 11    multivitamin with minerals iron-free (Centrum Silver), Take 1 tablet by mouth once daily., Disp: , Rfl:     tamsulosin (Flomax) 0.4 mg 24 hr capsule, Take 1 capsule (0.4 mg) by mouth once daily., Disp: 30 capsule, Rfl: 3      SOCIAL  HISTORY:  Patient  reports that he has never smoked. He has never been exposed to tobacco smoke. He has never used smokeless tobacco. He reports current alcohol use of about 15.0 standard drinks of alcohol per week. He reports current drug use. Frequency: 1.00 time per week. Drugs: Other and Marijuana.   Social History     Socioeconomic History    Marital status:      Spouse name: Not on file    Number of children: Not on file    Years of education: Not on file    Highest education level: Not on file   Occupational History    Not on file   Tobacco Use    Smoking status: Never     Passive exposure: Never    Smokeless tobacco: Never   Vaping Use    Vaping status: Never Used   Substance and Sexual Activity    Alcohol use: Yes     Alcohol/week: 15.0 standard drinks of alcohol     Types: 5 Glasses of wine, 5 Cans of beer, 5 Shots of liquor per week     Comment: states 15-20 drinks a week per pt    Drug use: Yes     Frequency: 1.0 times per week     Types: Other, Marijuana     Comment: cannabis gummies per pt one time a week    Sexual activity: Defer   Other Topics Concern    Not on file   Social History Narrative    Not on file     Social Drivers of Health     Financial Resource Strain: Low Risk  (9/19/2024)    Overall Financial Resource Strain (CARDIA)     Difficulty of Paying Living Expenses: Not hard at all   Food Insecurity: Not on file   Transportation Needs: No Transportation Needs (9/19/2024)    PRAPARE - Transportation     Lack of Transportation (Medical): No     Lack of Transportation (Non-Medical): No   Physical Activity: Not on file   Stress: Not on file   Social Connections: Not on file   Intimate Partner Violence: Not on file   Housing Stability: Low Risk  (9/19/2024)    Housing Stability Vital Sign     Unable to Pay for Housing in the Last Year: No     Number of Times Moved in the Last Year: 1     Homeless in the Last Year: No       FAMILY HISTORY:  Family History   Problem Relation Name Age of Onset  "   Stroke Mother      Leukemia Father         REVIEW OF SYSTEMS:   Constitutional: Negative for fever and chills. Denies anorexia, weight loss.  Eyes: Negative for visual disturbance.   Respiratory: Negative for shortness of breath.    Cardiovascular: Negative for chest pain.   Gastrointestinal: Negative for nausea and vomiting.   Genitourinary: See interval history above.  Skin: Negative for rash.   Neurological: Negative for dizziness and numbness.   Psychiatric/Behavioral: Negative for confusion and decreased concentration.     PHYSICAL EXAM:  Blood pressure 124/75, pulse 67, temperature 36.1 °C (96.9 °F), temperature source Temporal, resp. rate 16, height 1.956 m (6' 5\"), weight 106 kg (233 lb 6.4 oz), SpO2 99%.  Constitutional: Patient appears well-developed and well-nourished. No distress.    Head: Normocephalic and atraumatic.    Neck: Normal range of motion.    Cardiovascular: Normal rate.    Pulmonary/Chest: Effort normal. No respiratory distress.   Abdominal: soft NTND  Musculoskeletal: Normal range of motion.    Neurological: Alert and oriented to person, place, and time.  Psychiatric: Normal mood and affect. Behavior is normal. Thought content normal.      PATHOLOGY REVIEW:  FINAL DIAGNOSIS   A. Prostate, Left Paramedian [place], Biopsy:   -- Benign prostatic tissue     B. Prostate, Region Of Interest #1, Biopsy:   -- Prostatic adenocarcinoma, Willie score 3+4=7 (Grade group 2), involving five of five cores and approximately 80% of the specimen.  -- Intraductal carcinoma is identified  -- Perineural invasion is identified     Note: Redfield pattern 4 comprises approximately 40% of the overall tumor volume. Cribriform growth is not definitively identified.     C. Prostate, Left Paramedian [place], Biopsy:   -- Benign prostatic tissue     D. Prostate, Left Posterior [place], Biopsy:   -- Benign prostatic tissue     E. Prostate, Left Posterior [place], Biopsy:  -- Benign prostatic tissue     F. Prostate, Left Lateral, " Biopsy:   -- Fragments of benign skeletal muscle  -- No prostatic glands identified     G. Prostate, Left Anterior, Biopsy:   -- Benign prostatic tissue and fragment of benign skeletal muscle     H. Prostate, Right Anterior, Biopsy:   -- Benign prostatic tissue     I. Prostate, Right Lateral, Biopsy:   -- Prostatic adenocarcinoma, Willie score 3+3=6 (Grade group 1), involving one of two cores and less than 5% of the specimen     J. Prostate, Right Paramedian [place], Biopsy:  -- Benign prostatic tissue     K.  Prostate, Right Paramedian [place], Biopsy:  -- Benign prostatic tissue     L.  Prostate, Right Posterior [place], Biopsy:  -- Prostatic adenocarcinoma, Egnar score 3+3=6 (Grade group 1), involving one of one core and approximately 40% of the specimen  -- Atypical intraductal proliferation (AIP, see note)     M.  Prostate, Right Posterior [place], Biopsy:  -- Prostatic adenocarcinoma, Willie score 3+3=6 (Grade group 1), involving one of one core and approximately 45% of the specimen    -- Atypical intraductal proliferation (AIP, see note)     Note: Foci with atypical intraductal proliferation are noted. These foci demonstrate intact basal cell layer and atypia beyond what is typically seen in high-grade PIN, yet fall short of establishing a definitive diagnosis of intraductal carcinoma.     LABORATORY REVIEW:     Lab Results   Component Value Date    BUN 17 09/19/2024    CREATININE 1.01 09/19/2024    EGFR 85 09/19/2024     09/19/2024    K 3.8 09/19/2024     09/19/2024    CO2 24 09/19/2024    CALCIUM 8.4 (L) 09/19/2024      Lab Results   Component Value Date    WBC 5.1 09/19/2024    RBC 4.50 09/19/2024    HGB 14.2 09/19/2024    HCT 40.5 (L) 09/19/2024    MCV 90 09/19/2024    MCH 31.6 09/19/2024    MCHC 35.1 09/19/2024    RDW 12.4 09/19/2024     09/19/2024             Assessment:      1. Malignant neoplasm of prostate (Multi)  Referral to Radiation Oncology      2. BPH with obstruction/lower urinary  tract symptoms  Measure post void residual           Plan:   Discussed his presented in details.  Reviewed and interpreted patient's PVR which is 157 ml today, which means he is not emptying well. Going forward, we will increase his Flomax 0.4 mg dosage to two tablet daily.    Reviewed and interpreted pathology results with patient that revealed favorable-intermediate prostate cancer.  Discussed the different management options with patient which include but are not limited to active surveillance, surgical  intervention(prostatectomy), and radiation.  We discussed the risks and benefits of each management option  I explained to him that I primarily perform surgeries, and we will refer him to one of my colleagues Dr. Mccormack who specializes in radiation oncology, if he elects radiation as his treatment option.  I explained to him that if he chooses the surgical option, it will be minimally invasive. We discussed the procedure in detail, including the recovery time and what to expect during this process. Additionally, we will follow up every three months for the first two years to monitor his progress.  The patient will meet with Dr. Mccormack to discuss radiation as an option.      51 minutes total spent on patient's care today; >50% time spent on counseling/coordination of care       Scribe Attestation  By signing my name below, Lian PACHECO , Shirin   attest that this documentation has been prepared under the direction and in the presence of Fredy Diallo MD.

## 2024-12-05 LAB
AP SUMMARY REPORT: NORMAL
SCAN RESULT: NORMAL

## 2024-12-10 ENCOUNTER — PATIENT OUTREACH (OUTPATIENT)
Dept: PRIMARY CARE | Facility: CLINIC | Age: 59
End: 2024-12-10
Payer: COMMERCIAL

## 2024-12-12 ENCOUNTER — HOSPITAL ENCOUNTER (OUTPATIENT)
Dept: RADIATION ONCOLOGY | Facility: CLINIC | Age: 59
Setting detail: RADIATION/ONCOLOGY SERIES
Discharge: HOME | End: 2024-12-12
Payer: COMMERCIAL

## 2024-12-12 ENCOUNTER — LAB (OUTPATIENT)
Dept: LAB | Facility: CLINIC | Age: 59
End: 2024-12-12
Payer: COMMERCIAL

## 2024-12-12 VITALS
WEIGHT: 235.23 LBS | TEMPERATURE: 96.5 F | SYSTOLIC BLOOD PRESSURE: 126 MMHG | OXYGEN SATURATION: 100 % | DIASTOLIC BLOOD PRESSURE: 80 MMHG | HEART RATE: 73 BPM | HEIGHT: 77 IN | RESPIRATION RATE: 16 BRPM | BODY MASS INDEX: 27.77 KG/M2

## 2024-12-12 DIAGNOSIS — C61 MALIGNANT NEOPLASM OF PROSTATE (MULTI): ICD-10-CM

## 2024-12-12 DIAGNOSIS — C61 PROSTATE CANCER (MULTI): ICD-10-CM

## 2024-12-12 DIAGNOSIS — C61 PROSTATE CANCER (MULTI): Primary | ICD-10-CM

## 2024-12-12 LAB
PSA SERPL-MCNC: 4 NG/ML
TESTOST SERPL-MCNC: 300 NG/DL (ref 240–1000)

## 2024-12-12 PROCEDURE — 84153 ASSAY OF PSA TOTAL: CPT

## 2024-12-12 PROCEDURE — 36415 COLL VENOUS BLD VENIPUNCTURE: CPT

## 2024-12-12 PROCEDURE — 84403 ASSAY OF TOTAL TESTOSTERONE: CPT

## 2024-12-12 PROCEDURE — 99205 OFFICE O/P NEW HI 60 MIN: CPT | Performed by: STUDENT IN AN ORGANIZED HEALTH CARE EDUCATION/TRAINING PROGRAM

## 2024-12-12 PROCEDURE — 99215 OFFICE O/P EST HI 40 MIN: CPT | Mod: 25 | Performed by: STUDENT IN AN ORGANIZED HEALTH CARE EDUCATION/TRAINING PROGRAM

## 2024-12-12 ASSESSMENT — LIFESTYLE VARIABLES
HOW OFTEN DO YOU HAVE A DRINK CONTAINING ALCOHOL: 2-3 TIMES A WEEK
HOW OFTEN DO YOU HAVE SIX OR MORE DRINKS ON ONE OCCASION: NEVER
HOW MANY STANDARD DRINKS CONTAINING ALCOHOL DO YOU HAVE ON A TYPICAL DAY: 1 OR 2
AUDIT-C TOTAL SCORE: 3
SKIP TO QUESTIONS 9-10: 1

## 2024-12-12 ASSESSMENT — ENCOUNTER SYMPTOMS
EYES NEGATIVE: 1
ARTHRALGIAS: 1
NERVOUS/ANXIOUS: 1
SORE THROAT: 1
CONSTITUTIONAL NEGATIVE: 1
CARDIOVASCULAR NEGATIVE: 1
HEMATOLOGIC/LYMPHATIC NEGATIVE: 1
DIZZINESS: 1
COUGH: 1
DYSURIA: 1
FREQUENCY: 1
CONSTIPATION: 1
RHINORRHEA: 1
CHILLS: 0
COUGH: 1

## 2024-12-12 ASSESSMENT — PATIENT HEALTH QUESTIONNAIRE - PHQ9
1. LITTLE INTEREST OR PLEASURE IN DOING THINGS: NOT AT ALL
SUM OF ALL RESPONSES TO PHQ9 QUESTIONS 1 AND 2: 0
2. FEELING DOWN, DEPRESSED OR HOPELESS: NOT AT ALL

## 2024-12-12 ASSESSMENT — NCCN CANCER DISTRESS MANAGEMENT
NCCN PHYSICAL CONCERNS: 2
NCCN EMOTIONAL CONCERNS: 1
NCCN EMOTIONAL CONCERNS: 5
NCCN PRACTICAL CONCERNS: 7
NCCN PHYSICAL CONCERNS: 1

## 2024-12-12 ASSESSMENT — PAIN SCALES - GENERAL: PAINLEVEL_OUTOF10: 0-NO PAIN

## 2024-12-12 NOTE — PROGRESS NOTES
Radiation Oncology Nursing Note    IPSS (International Prostate Symptom Score):   16 / 35, bother 2   - He is not experiencing urinary incontinence.  Flomax    - He is experiencing dysuria, hematuria, flank pain. Pressure that is relieved with medication.      Sexual function:   - Quality of erections during the last 4 weeks: 4 = Firm enough for intercourse  - Use of erectile dysfunction medications:  None    Bowel function:    - Denies hematochezia or pain.    - He does have a history of hemorrhoids.    - He does not have a history of inflammatory bowel disease (Crohn's, Ulcerative Colitis).    Prior Radiotherapy:  No  Radiation Treatments       No radiation treatments to show. (Treatments may have been administered in another system.)          Current Systemic Treatment:  No     Presence of Pacemaker or ICD:  No    History of Autoimmune or Connective Tissue Disorders:  No    Pain: The patient's current pain level was assessed.  They report currently having a pain of 0 out of 10.  They feel their pain is under control with the use of pain medications.  Pain in all joints.  New pain in right hip that hurts the most.      Review of Systems:  Review of Systems   Constitutional: Negative.    HENT:  Negative.     Eyes: Negative.    Respiratory:  Positive for cough (has a cold).    Cardiovascular: Negative.    Gastrointestinal:  Positive for constipation.   Genitourinary:  Positive for dysuria, frequency and nocturia.         Intermittency, retention, urgency, straining, weak stream   Musculoskeletal:  Positive for arthralgias.        New right hip pain as of 5 weeks ago.     Skin: Negative.    Neurological:  Positive for dizziness.   Hematological: Negative.    Psychiatric/Behavioral:  The patient is nervous/anxious.

## 2024-12-12 NOTE — PROGRESS NOTES
Staff Physician: Silvana Mccormack MD PhD  Referring Physician: Fredy Diallo MD  Date of Service: 12/12/2024  Patient name: Steven Brice   MRN: [id number]    RADIATION ONCOLOGY CONSULT NOTE    IDENTIFYING DATA:  DIAGNOSIS: Newly diagnosed, localized   Cancer Staging   Malignant neoplasm of prostate (Multi)  Staging form: Prostate, AJCC 8th Edition  - Clinical stage from 12/2/2024: cT1c, cN0, PSA: 5.4, Grade Group: 2 - Signed by Silvana Mccormack MD PhD on 12/12/2024    DISEASE STATE: No prior treatment  DISEASE STATUS: Treatment pending work-up  Problem List Items Addressed This Visit       Malignant neoplasm of prostate (Multi)    Relevant Orders    Referral to Radiation Oncology    NM PET CT prostate PSMA     Other Visit Diagnoses       Prostate cancer (Multi)    -  Primary    Relevant Orders    Prostate Specific Antigen, Screen    Testosterone          Mr. Steven Brice is a 59-year-old with newly diagnosed prostate adenocarcinoma, referred by Fredy Hargrove MD, for evaluation and discussion of treatment recommendations.    HISTORY OF PRESENT ILLNESS:  6/27/2024 PSA 5.4    6/28/2024 CT AP without contrast in setting of left side pain nodes 1.1 cm calcification/stone midpole left renal cortex without hydronephrosis or ureteral stones.  Stomach was distended with concern for gastric outlet obstruction.  Had multiple hypoattenuating densities throughout the spleen.  Multilevel degenerative changes in spine    7/30/2024 MRI prostate notes a 65 g gland, PI-RADS 5 lesion in the right PZ at apex, with a long capsular contact raising concern for microscopic MARK.  Otherwise no SV invasion or abnormal lymphadenopathy.    11/8/2024 targeted and systematic biopsy by Dr. Diallo noted GG2 (disease 3/12 cores (all GG1), 1/1 targeted core of GG2), intraductal carcinoma+, PNI+, decipher 0.88    Today, Mr. Steven Brice is accompanied by his daughter.  Symptomatically, he reports:    1.  Full independence in activities of  daily living. Works full time.   2.  Urinary function -- IPSS 16, some improvement since increasing Flomax to 0.8mg. PVR was 157cc with Dr. Diallo.   3.  Bowel function is normal, tends to be constipated, requires prune juice.  4.  Normal appetite. No unintentional weight gain or loss.   5.  Pain score: 5/10 when he moves his right hip. This started about a few weeks ago. Denies trauma to the region. The pain is limiting his ability to exercise.   6.  He denies a personal history of MI or stroke. Denies history of diabetes, HgbA1c is 5.1%    PAST MEDICAL HISTORY:  Past Medical History:   Diagnosis Date    Arthritis 2020    Atrial fibrillation (Multi)     s/p cardioversion 9/19/24 - continued medication    BPH (benign prostatic hyperplasia)     GERD (gastroesophageal reflux disease) 2020    under control    Gout     HL (hearing loss) Years ago    left ear    Nephrolithiasis     OA (osteoarthritis)     Prostate cancer (Multi)     Spinal stenosis     lumbar    Vertigo     mild symptoms     PAST SURGICAL HISTORY:  Past Surgical History:   Procedure Laterality Date    CARDIOVERSION  09/19/2024    COLONOSCOPY      HEMORRHOID SURGERY  06/27/2014    HERNIA REPAIR  2010    x3 -- 1987 and 2010 and 8/28/2024    KNEE ARTHROSCOPY W/ DEBRIDEMENT Left      ALLERGIES:  No Known Allergies  MEDICATIONS:    Current Outpatient Medications:     cholecalciferol (Vitamin D3) 50 mcg (2,000 unit) capsule, Take 1 capsule (50 mcg) by mouth once daily., Disp: , Rfl:     DIETARY SUPPLEMENT ORAL, Take by mouth. PROBIOTICS WHEN TRAVELING, Disp: , Rfl:     multivitamin with minerals iron-free (Centrum Silver), Take 1 tablet by mouth once daily., Disp: , Rfl:     tamsulosin (Flomax) 0.4 mg 24 hr capsule, Take 2 capsules (0.8 mg) by mouth once daily., Disp: 90 capsule, Rfl: 3    flecainide (Tambocor) 100 mg tablet, Take 1 tablet (100 mg) by mouth 2 times a day. (Patient not taking: Reported on 12/12/2024), Disp: 60 tablet, Rfl: 11   SOCIAL  "HISTORY:  Social History     Tobacco Use    Smoking status: Never     Passive exposure: Never    Smokeless tobacco: Never   Substance Use Topics    Alcohol use: Yes     Alcohol/week: 15.0 standard drinks of alcohol     Types: 5 Glasses of wine, 5 Cans of beer, 5 Shots of liquor per week     Comment: states 15-20 drinks a week per pt     FAMILY HISTORY:  Family History   Problem Relation Name Age of Onset    Stroke Mother Nelia Brice     Arthritis Mother Nelia Brice     Atrial fibrillation Mother Nelia Brice     Leukemia Father Steven Brice     Blood Disorder Father Steven Brice        REVIEW OF SYSTEMS:  Please refer to RN note.    PHYSICAL EXAMINATION:  /80 (BP Location: Right arm, Patient Position: Sitting, BP Cuff Size: Adult)   Pulse 73   Temp 35.8 °C (96.5 °F) (Temporal)   Resp 16   Ht 1.956 m (6' 5.01\")   Wt 107 kg (235 lb 3.7 oz)   SpO2 100%   BMI 27.89 kg/m²   Constitutional: well developed, no distress, alert & oriented, cooperative  Eyes: pupils equal round and reactive to light, extraocular movements intact  Respiratory: normal work of breathing  Extremities: no clubbing or edema  Psychological: normal affect    CTCAE (v5) ADVERSE EVENTS:  Toxicity Assessment          2024    13:00   Toxicity Assessment   Treatment Site Pelvis - male   Pain Grade 1       right hip pain   Constipation Grade 1   Hematuria Grade 0       history of asymptomatic kidney stones   Erectile Dysfunction Grade 0   Urinary Frequency Grade 1       IPSS 16, bother of 2, nocturia x3   Urinary Retention Grade 2       PVR of 157cc, on flomax 0.8mg   Urinary Tract Pain Grade 1   Urinary Urgency Grade 1       PERFORMANCE STATUS:  KPS/ECO, Fully active, able to carry on all pre-disease performed without restriction (ECOG equivalent 0)    LABORATORY AND IMAGING DATA:  Imaging: All imaging was personally reviewed and interpreted in clinic. Findings as per HPI and EMR.    Laboratory/Pathology:  All pertinent " "labs and pathology were personally reviewed and interpreted in clinic. Findings as per HPI and EMR.  No results found for: \"PSAU\", \"PSA\", \"PSASCREEN\"  No results found for: \"TESTOSTERONE\", \"TESTOTOTMS\"      Lab Results   Component Value Date    BUN 17 09/19/2024    CREATININE 1.01 09/19/2024    EGFR 85 09/19/2024     09/19/2024    K 3.8 09/19/2024     09/19/2024    CO2 24 09/19/2024    CALCIUM 8.4 (L) 09/19/2024    HGBA1C 5.1 09/19/2024        IMPRESSION:  59 year-old gentleman with newly diagnosed prostate adenocarcinoma, iPSA 5.4, GG2 (disease 3/12 cores (all GG1), 1/1 targeted core of GG2), intraductal carcinoma+, PNI+, Decipher 0.88.    He falls into intermediate risk prostate cancer based on his clinical-pathologic factors. He is not in any pain.    Given his recent persistent right hip pain and higher decipher score, will obtain PSMA PET/CT for staging.     I discussed that his treatment options include surgery or definitive external beam radiation therapy (EBRT) with or without a short course of hormone suppression.  I discussed various EBRT treatment regimens, including moderate hypofractionation over 20 treatments, and ultra hypofractionation over 5 treatments. His IPSS is 16 (on flomax 0.8mg).     I discussed the logistics of radiation, including placement of fiducials into the prostate for improved and spacer gel to reduce radiation dose to the rectum.  Logistics of radiation therapy including CT-based simulation and bowel and bladder preparation were discussed, as well as risks and benefits of radiation therapy with a detailed discussion of possible short- and long-term side effects including fatigue, toxicities to the bladder, urethra, and rectum, sexual dysfunction, infertility and secondary malignancy in the radiation field.     Potential side effects from hormonal suppression therapy were discussed, which include but are not limited to sexual dysfunction, increased risk of osteoporosis " and bone fractures, vasomotor 'hot flashes', weight gain and body composition changes, elevated cholesterol and lipoprotein levels, increased risk for diabetes mellitus/hyperglycemia, cardiovascular disease, fatigue, anemia, gynecomastia, cognitive decline, and decrease in penis and/or testicle size.  The severity and/or permanence of such side effects are expected to be related to the duration of suppression. I encouraged him to exercise to help offset/reduce many of these symptoms, including bone density, weight changes, and muscle mass. Educated on daily Calcium and Vitamin D supplementation.     Mr. Brice asked a number of excellent questions that demonstrated good understanding, and would like to proceed with PSMA PET/CT staging. He will also let us know if he decides to proceed with radiation.     PLAN:  -PSMA PET/CT scheduled for 12/27/2024, will call with results    Thank you for the opportunity to participate in the care of this kind patient.    Silvana Mcocrmack MD PhD  , Radiation Oncology

## 2024-12-13 ENCOUNTER — TELEMEDICINE (OUTPATIENT)
Dept: PRIMARY CARE | Facility: CLINIC | Age: 59
End: 2024-12-13
Payer: COMMERCIAL

## 2024-12-13 DIAGNOSIS — J01.10 ACUTE NON-RECURRENT FRONTAL SINUSITIS: Primary | ICD-10-CM

## 2024-12-13 DIAGNOSIS — F51.01 PRIMARY INSOMNIA: ICD-10-CM

## 2024-12-13 PROCEDURE — 99213 OFFICE O/P EST LOW 20 MIN: CPT | Performed by: INTERNAL MEDICINE

## 2024-12-13 RX ORDER — AMOXICILLIN AND CLAVULANATE POTASSIUM 875; 125 MG/1; MG/1
1 TABLET, FILM COATED ORAL 2 TIMES DAILY
Qty: 20 TABLET | Refills: 0 | Status: SHIPPED | OUTPATIENT
Start: 2024-12-13 | End: 2024-12-23

## 2024-12-13 RX ORDER — TRAZODONE HYDROCHLORIDE 50 MG/1
25 TABLET ORAL NIGHTLY
Qty: 30 TABLET | Refills: 0 | Status: SHIPPED | OUTPATIENT
Start: 2024-12-13 | End: 2025-12-13

## 2024-12-13 NOTE — PROGRESS NOTES
Patient with a past medical history of GERD, Esophagitis, Hearing loss, Osteoarthritis, Kidney stones/ Prostate CA, Lumbar stenosis, Constipation, Tubular Adenoma (2029) history of atrial fibrillation RVR s/p cardioversion     Consent: This visit was initiated by the patient. Audio and visual communication was utilized in real-time. I confirmed understanding of risks and benefits of telehealth visits and obtained consent to proceed with the telemedicine visit.    Patient has been having a cough sore throat congestion green phlegm and malaise for the last 7 days  Recently diagnosed with prostate carcinoma and starting treatment so quite anxious and not sleeping    Virtual visit: Level 3 exam  CONSTITUTIONAL: Patient appears well developed and well nourished.   GENERAL: This is a healthy appearing individual who appears stated age. The patient is alert and appropriately verbally conversant without hoarseness. This patient is in no apparent distress. Not acutely ill or toxic appearing.   FACE: The face was inspected and no cutaneous masses or lesions were visualized. There was no erythema or edema noted. Facial movement was symmetric.   EYES: Extra-ocular muscle function was intact. No nystagmus was observed. Pupils were equal.   CRANIAL NERVES: Cranial nerves II, III, IV, and VI were noted to be intact via extra-ocular muscle movement testing. Cranial nerve VII noted to be intact and symmetric by facial movement. Cranial nerve VIII was tested with whispered voice examination and revealed symmetric hearing. Cranial nerves IX and X noted to be intact by gag reflex and palatal movement. Cranial nerve XII noted to be intact by active and symmetric tongue movement.   NOSE: Examination of the nose revealed the nasal dorsum to be midline.   ORAL CAVITY: Examination of the oral cavity revealed no mass lesions nor infection. The palate was noted to be intact. The tongue exhibited normal mobility. Mucosa was moist without  lesion. The lips were free of lesion. Gums were free of inflammation.   Dentition: normal without obvious infection or inflammation, overbite/overjet, retrognathia  OROPHARYNX: The oral pharynx was free of mass lesion or mucosal abnormality. The palate was noted to be without lesion. The uvula was normal appearing.   EARS: Examination of the ears revealed that the auricles were normally formed with no lesions.   NECK: No skin lesions or inflammatory processes were detected on visual inspection.  CARDIOVASCULAR: Peripheral perfusion intact, no evidence of cyanosis, clubbing or edema.   RESPIRATORY: Normal inspiration and expiration and chest wall expansion, no use of accessory muscles to breathe, no stridor.  NEUROLOGICAL: Mentation is clear. Alert and oriented to time, place, and person. Answering questions appropriately.  PSYCHIATRIC: Normal affect and appropriate behavior. Mood is without obvious agitation or disturbance. No evidence of obvious depressive behaviors.    #Acute sinusitis  Will prescribe Augmentin 875 twice a day for 7 days    #Insomnia  Brought on by anxiety from the recent diagnosis of prostate cancer  Prescribe trazodone 25 mg at bedtime    #Paroxysmal fibrillation  Counseled the patient to keep the flecainide on hand and knee and use the pill in pocket approach if he does go back into A-fib    total time spent reviewing chart, labs and coordinating care was 15 minutes    Answers submitted by the patient for this visit:  Cough Questionnaire (Submitted on 12/12/2024)  Chief Complaint: Cough  Chronicity: new  Onset: in the past 7 days  Progression since onset: gradually worsening  Frequency: hourly  Cough characteristics: productive of brown sputum  chest pain: No  chills: No  ear congestion: Yes  nasal congestion: Yes  postnasal drip: Yes  rhinorrhea: Yes  sore throat: Yes  Aggravated by: nothing

## 2024-12-16 ENCOUNTER — APPOINTMENT (OUTPATIENT)
Dept: UROLOGY | Facility: HOSPITAL | Age: 59
End: 2024-12-16
Payer: COMMERCIAL

## 2024-12-17 ENCOUNTER — DOCUMENTATION (OUTPATIENT)
Dept: CASE MANAGEMENT | Facility: HOSPITAL | Age: 59
End: 2024-12-17
Payer: COMMERCIAL

## 2024-12-17 LAB
ATRIAL RATE: 40 BPM
P AXIS: 103 DEGREES
P OFFSET: 203 MS
P ONSET: 145 MS
PR INTERVAL: 154 MS
Q ONSET: 222 MS
QRS COUNT: 6 BEATS
QRS DURATION: 88 MS
QT INTERVAL: 458 MS
QTC CALCULATION(BAZETT): 373 MS
QTC FREDERICIA: 399 MS
R AXIS: 228 DEGREES
T AXIS: 106 DEGREES
T OFFSET: 451 MS
VENTRICULAR RATE: 40 BPM

## 2024-12-17 NOTE — PROGRESS NOTES
SW referred to patient for high distress score. Patient is a 59 year old  male recently diagnosed with prostate cancer. Patient is working full-time and managing independently. He is very communicative and pleasant. SW explained her role on the interdisciplinary team. Patient hasn't made a treatment decision. He is gathering information and figuring out what is best for him.  Patient told SW that he was looking at prostate support groups on line.  SW discussed [facility] and will send him a brochure via  [place] mail. No additional needs at this time..

## 2024-12-20 DIAGNOSIS — N40.1 BPH WITH OBSTRUCTION/LOWER URINARY TRACT SYMPTOMS: ICD-10-CM

## 2024-12-20 DIAGNOSIS — N13.8 BPH WITH OBSTRUCTION/LOWER URINARY TRACT SYMPTOMS: ICD-10-CM

## 2024-12-20 RX ORDER — TAMSULOSIN HYDROCHLORIDE 0.4 MG/1
0.4 CAPSULE ORAL DAILY
Qty: 90 CAPSULE | Refills: 1 | Status: SHIPPED | OUTPATIENT
Start: 2024-12-20

## 2024-12-26 DIAGNOSIS — J20.9 ACUTE BRONCHITIS, UNSPECIFIED ORGANISM: Primary | ICD-10-CM

## 2024-12-26 RX ORDER — LEVOFLOXACIN 750 MG/1
750 TABLET ORAL DAILY
Qty: 7 TABLET | Refills: 0 | Status: SHIPPED | OUTPATIENT
Start: 2024-12-26 | End: 2025-01-02

## 2024-12-26 NOTE — PROGRESS NOTES
Symptoms persist after completing course of Augmentin  Now with green productive phlegm  Sending in prescription for Levaquin and getting a chest x-ray

## 2024-12-27 ENCOUNTER — HOSPITAL ENCOUNTER (OUTPATIENT)
Dept: RADIOLOGY | Facility: HOSPITAL | Age: 59
Discharge: HOME | End: 2024-12-27
Payer: COMMERCIAL

## 2024-12-27 DIAGNOSIS — C61 MALIGNANT NEOPLASM OF PROSTATE (MULTI): ICD-10-CM

## 2024-12-27 PROCEDURE — 78815 PET IMAGE W/CT SKULL-THIGH: CPT | Mod: PI

## 2024-12-27 PROCEDURE — A9596 HC RX 343 DIAGNOSTIC RADIOPHARMACEUTICALS: HCPCS | Mod: TB | Performed by: STUDENT IN AN ORGANIZED HEALTH CARE EDUCATION/TRAINING PROGRAM

## 2024-12-27 PROCEDURE — 3430000001 HC RX 343 DIAGNOSTIC RADIOPHARMACEUTICALS: Mod: TB | Performed by: STUDENT IN AN ORGANIZED HEALTH CARE EDUCATION/TRAINING PROGRAM

## 2025-01-04 DIAGNOSIS — F51.01 PRIMARY INSOMNIA: ICD-10-CM

## 2025-01-06 ENCOUNTER — OFFICE VISIT (OUTPATIENT)
Dept: UROLOGY | Facility: HOSPITAL | Age: 60
End: 2025-01-06
Payer: COMMERCIAL

## 2025-01-06 DIAGNOSIS — C61 MALIGNANT NEOPLASM OF PROSTATE (MULTI): Primary | ICD-10-CM

## 2025-01-06 PROCEDURE — 99417 PROLNG OP E/M EACH 15 MIN: CPT | Performed by: STUDENT IN AN ORGANIZED HEALTH CARE EDUCATION/TRAINING PROGRAM

## 2025-01-06 PROCEDURE — 1036F TOBACCO NON-USER: CPT | Performed by: STUDENT IN AN ORGANIZED HEALTH CARE EDUCATION/TRAINING PROGRAM

## 2025-01-06 PROCEDURE — 99215 OFFICE O/P EST HI 40 MIN: CPT | Performed by: STUDENT IN AN ORGANIZED HEALTH CARE EDUCATION/TRAINING PROGRAM

## 2025-01-06 RX ORDER — TRAZODONE HYDROCHLORIDE 50 MG/1
TABLET ORAL
Qty: 45 TABLET | Refills: 1 | Status: SHIPPED | OUTPATIENT
Start: 2025-01-06

## 2025-01-06 NOTE — PROGRESS NOTES
UROLOGIC FOLLOW-UP VISIT     PROBLEM LIST:  1. Malignant neoplasm of prostate (Multi)            HISTORY OF PRESENT ILLNESS:   Steven Brice is a 59 y.o. male  who has a history of elevated PSA and PIRADS 5 lesion on MRI and is s/p fusion biopsy. He presents today for the follow up to discuss his pathological results.     Interim history  11/21/24: He reports having hematuria, which he noted couple of days ago and has since subsided. His PVR today is 157 ml. He denies any fevers, chills, nausea, vomiting.  1/6/25:  He has seen Dr. Mccormack in radiation oncology, who dicussed with him various EBRT treatment regimens, including moderate hypofractionation over 20 treatments, and ultra hypofractionation over 5 treatments.         PAST MEDICAL HISTORY:  Past Medical History:   Diagnosis Date    Arthritis 2020    Atrial fibrillation (Multi)     s/p cardioversion 9/19/24 - continued medication    BPH (benign prostatic hyperplasia)     GERD (gastroesophageal reflux disease) 2020    under control    Gout     HL (hearing loss) Years ago    left ear    Nephrolithiasis     OA (osteoarthritis)     Prostate cancer (Multi)     Spinal stenosis     lumbar    Vertigo     mild symptoms       PAST SURGICAL HISTORY:  Past Surgical History:   Procedure Laterality Date    CARDIOVERSION  09/19/2024    COLONOSCOPY      HEMORRHOID SURGERY  06/27/2014    HERNIA REPAIR  2010    x3 -- 1987 and 2010 and 8/28/2024    KNEE ARTHROSCOPY W/ DEBRIDEMENT Left         ALLERGIES:   No Known Allergies     MEDICATIONS:     Current Outpatient Medications:     cholecalciferol (Vitamin D3) 50 mcg (2,000 unit) capsule, Take 1 capsule (50 mcg) by mouth once daily., Disp: , Rfl:     DIETARY SUPPLEMENT ORAL, Take by mouth. PROBIOTICS WHEN TRAVELING, Disp: , Rfl:     multivitamin with minerals iron-free (Centrum Silver), Take 1 tablet by mouth once daily., Disp: , Rfl:     tamsulosin (Flomax) 0.4 mg 24 hr capsule, TAKE 1 CAPSULE BY MOUTH ONCE DAILY., Disp: 90  capsule, Rfl: 1    traZODone (Desyrel) 50 mg tablet, Take 0.5 tablets (25 mg) by mouth once daily at bedtime., Disp: 30 tablet, Rfl: 0      SOCIAL HISTORY:  Patient  reports that he has never smoked. He has never been exposed to tobacco smoke. He has never used smokeless tobacco. He reports current alcohol use of about 15.0 standard drinks of alcohol per week. He reports current drug use. Frequency: 1.00 time per week. Drugs: Other and Marijuana.   Social History     Socioeconomic History    Marital status:      Spouse name: Not on file    Number of children: Not on file    Years of education: Not on file    Highest education level: Not on file   Occupational History    Not on file   Tobacco Use    Smoking status: Never     Passive exposure: Never    Smokeless tobacco: Never   Vaping Use    Vaping status: Never Used   Substance and Sexual Activity    Alcohol use: Yes     Alcohol/week: 15.0 standard drinks of alcohol     Types: 5 Glasses of wine, 5 Cans of beer, 5 Shots of liquor per week     Comment: states 15-20 drinks a week per pt    Drug use: Yes     Frequency: 1.0 times per week     Types: Other, Marijuana     Comment: cannabis gummies per pt one time a week    Sexual activity: Defer   Other Topics Concern    Not on file   Social History Narrative    Not on file     Social Drivers of Health     Financial Resource Strain: Low Risk  (9/19/2024)    Overall Financial Resource Strain (CARDIA)     Difficulty of Paying Living Expenses: Not hard at all   Food Insecurity: Not on file   Transportation Needs: No Transportation Needs (9/19/2024)    PRAPARE - Transportation     Lack of Transportation (Medical): No     Lack of Transportation (Non-Medical): No   Physical Activity: Not on file   Stress: Not on file   Social Connections: Not on file   Intimate Partner Violence: Not on file   Housing Stability: Low Risk  (9/19/2024)    Housing Stability Vital Sign     Unable to Pay for Housing in the Last Year: No      Number of Times Moved in the Last Year: 1     Homeless in the Last Year: No       FAMILY HISTORY:  Family History   Problem Relation Name Age of Onset    Stroke Mother Nelia Brice     Arthritis Mother Nelia Brice     Atrial fibrillation Mother Nelia Brice     Leukemia Father Steven Brice     Blood Disorder Father Steven Brice        REVIEW OF SYSTEMS:   Constitutional: Negative for fever and chills. Denies anorexia, weight loss.  Eyes: Negative for visual disturbance.   Respiratory: Negative for shortness of breath.    Cardiovascular: Negative for chest pain.   Gastrointestinal: Negative for nausea and vomiting.   Genitourinary: See interval history above.  Skin: Negative for rash.   Neurological: Negative for dizziness and numbness.   Psychiatric/Behavioral: Negative for confusion and decreased concentration.     PHYSICAL EXAM:  There were no vitals taken for this visit.  Constitutional: Patient appears well-developed and well-nourished. No distress.    Head: Normocephalic and atraumatic.    Neck: Normal range of motion.    Cardiovascular: Normal rate.    Pulmonary/Chest: Effort normal. No respiratory distress.   Abdominal: soft NTND  Musculoskeletal: Normal range of motion.    Neurological: Alert and oriented to person, place, and time.  Psychiatric: Normal mood and affect. Behavior is normal. Thought content normal.      PATHOLOGY REVIEW:  FINAL DIAGNOSIS   A. Prostate, Left Paramedian [place], Biopsy:   -- Benign prostatic tissue     B. Prostate, Region Of Interest #1, Biopsy:   -- Prostatic adenocarcinoma, Willie score 3+4=7 (Grade group 2), involving five of five cores and approximately 80% of the specimen.  -- Intraductal carcinoma is identified  -- Perineural invasion is identified     Note: Willie pattern 4 comprises approximately 40% of the overall tumor volume. Cribriform growth is not definitively identified.     C. Prostate, Left Paramedian [place], Biopsy:   -- Benign prostatic tissue     D.  Prostate, Left Posterior [place], Biopsy:   -- Benign prostatic tissue     E. Prostate, Left Posterior [place], Biopsy:  -- Benign prostatic tissue     F. Prostate, Left Lateral, Biopsy:   -- Fragments of benign skeletal muscle  -- No prostatic glands identified     G. Prostate, Left Anterior, Biopsy:   -- Benign prostatic tissue and fragment of benign skeletal muscle     H. Prostate, Right Anterior, Biopsy:   -- Benign prostatic tissue     I. Prostate, Right Lateral, Biopsy:   -- Prostatic adenocarcinoma, Arbon score 3+3=6 (Grade group 1), involving one of two cores and less than 5% of the specimen     J. Prostate, Right Paramedian [place], Biopsy:  -- Benign prostatic tissue     K.  Prostate, Right Paramedian [place], Biopsy:  -- Benign prostatic tissue     L.  Prostate, Right Posterior [place], Biopsy:  -- Prostatic adenocarcinoma, Arbon score 3+3=6 (Grade group 1), involving one of one core and approximately 40% of the specimen  -- Atypical intraductal proliferation (AIP, see note)     M.  Prostate, Right Posterior [place], Biopsy:  -- Prostatic adenocarcinoma, Willie score 3+3=6 (Grade group 1), involving one of one core and approximately 45% of the specimen    -- Atypical intraductal proliferation (AIP, see note)     Note: Foci with atypical intraductal proliferation are noted. These foci demonstrate intact basal cell layer and atypia beyond what is typically seen in high-grade PIN, yet fall short of establishing a definitive diagnosis of intraductal carcinoma.     LABORATORY REVIEW:     Lab Results   Component Value Date    BUN 17 09/19/2024    CREATININE 1.01 09/19/2024    EGFR 85 09/19/2024     09/19/2024    K 3.8 09/19/2024     09/19/2024    CO2 24 09/19/2024    CALCIUM 8.4 (L) 09/19/2024      Lab Results   Component Value Date    WBC 5.1 09/19/2024    RBC 4.50 09/19/2024    HGB 14.2 09/19/2024    HCT 40.5 (L) 09/19/2024    MCV 90 09/19/2024    MCH 31.6 09/19/2024    MCHC 35.1 09/19/2024    RDW 12.4  09/19/2024     09/19/2024             Assessment:      1. Malignant neoplasm of prostate (Multi)            Plan:   Discussed his presented in details.  Reviewed and interpreted pathology results with patient that revealed favorable-intermediate prostate cancer.  Discussed the different management options with patient which include but are not limited to active surveillance, surgical  intervention(prostatectomy), and radiation.  We discussed the risks and benefits of each management option  He has seen Dr. Mccormack who specializes in radiation oncology, who dicussed with him various EBRT treatment regimens, including moderate hypofractionation over 20 treatments, and ultra hypofractionation over 5 treatments.   I again explained to him that if he chooses the surgical option, it will be minimally invasive. We discussed the procedure in detail, including the recovery time and what to expect during this process. Additionally, we will follow up every three months for the first two years to monitor his progress.  Encouraged him to do Kegel exercises prior to and after the surgery.  The patient elected to proceed with surgery      55 minutes total spent on patient's care today; >50% time spent on counseling/coordination of care       Scribe Attestation  By signing my name below, Lian PACHECO , Shirin   attest that this documentation has been prepared under the direction and in the presence of Fredy Diallo MD.

## 2025-01-16 ENCOUNTER — APPOINTMENT (OUTPATIENT)
Dept: UROLOGY | Facility: CLINIC | Age: 60
End: 2025-01-16
Payer: COMMERCIAL

## 2025-01-27 ENCOUNTER — TELEPHONE (OUTPATIENT)
Dept: UROLOGY | Facility: HOSPITAL | Age: 60
End: 2025-01-27
Payer: COMMERCIAL

## 2025-01-27 NOTE — TELEPHONE ENCOUNTER
"Patient called to cancel his virtual visit today with Dr. Diallo. Per patient\" Patient left message and cancelled appointment.  He is planning to take a different route and was referred to someone at [facility].     He appreciates and thanks everyone for the services that he has received.\"  Dr. Mccormack and Dr. Diallo have been made aware.     "

## 2025-01-28 ENCOUNTER — APPOINTMENT (OUTPATIENT)
Dept: UROLOGY | Facility: HOSPITAL | Age: 60
End: 2025-01-28
Payer: COMMERCIAL

## 2025-04-04 NOTE — PROGRESS NOTES
Primary Care Physician: Lilliam Stallings MD  Date of Visit: 04/08/2025  9:40 AM EDT  Location of visit: [facility] [address]     Chief Complaint:   6 month follow up       HPI / Summary:   Steven Brice is a 59 y.o. male with h/o GERD, hernia repair, BPH, afib s/p LAKESHA/DCC 9/19/24      Initial consult 9/2024:  59-year-old male with history of GERD with esophagitis on EGD 2021, hernia repair, BPH presents with several days of progressive dyspnea, dizziness and lightheadedness.  He had inguinal surgery a couple of weeks ago and since that time has had diminished mobilization and a general sense of deconditioning.  He started to increase activity about 5 days ago and noticed irregular pulse and rhythm as well as easy tachycardia with heart rates in the 140s with minimal activity and some occasional dizziness.  He thought initially might just be deconditioning after surgery.  He went to see physician who recommended he come to the ER for an EKG and further evaluation.  Upon arrival to the ER he was found to be in A-fib with heart rates in the 80s.     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  Admits to drinking 4-5 beverages on a regular basis per day  No tobacco  Regular marijuana use    Interval events:  Overall doing well.  Recent prostate surgery.  Tolerated this well.  No recurrent atrial fibrillation since single episode.  No longer on metoprolol due to bradycardia.  He still has some as needed flecainide that Dr. Bean gave him.    Last Cardiology Tests:  ECG:  10/8/2024: Marked sinus bradycardia with HR 40 bpm, right superior axis  2 week monitor 10/2024: no further afib, 1 VT run lasting just 7 beats    Echo:  LAKESHA 9/19/24:  1. The left ventricular systolic function is mildly decreased, with a visually estimated ejection fraction of 45-50%.   2. Successful cardioversion for atrial fibrillation resulting in sinus bradycardia.   3. There is normal right ventricular global  systolic function.   4. There is no evidence of a patent foramen ovale.   5. No LA or JAQUELINE thrombus.    Cath:      Stress Test:      Cardiac Imaging:    Past Medical History:  Past Medical History:   Diagnosis Date    Arthritis 2020    Atrial fibrillation (Multi)     s/p cardioversion 9/19/24 - continued medication    BPH (benign prostatic hyperplasia)     GERD (gastroesophageal reflux disease) 2020    under control    Gout     HL (hearing loss) Years ago    left ear    Nephrolithiasis     OA (osteoarthritis)     Prostate cancer (Multi)     Spinal stenosis     lumbar    Vertigo     mild symptoms        Past Surgical History:  Past Surgical History:   Procedure Laterality Date    CARDIOVERSION  09/19/2024    COLONOSCOPY      HEMORRHOID SURGERY  06/27/2014    HERNIA REPAIR  2010    x3 -- 1987 and 2010 and 8/28/2024    KNEE ARTHROSCOPY W/ DEBRIDEMENT Left           Social History:  He reports that he has never smoked. He has never been exposed to tobacco smoke. He has never used smokeless tobacco. He reports current alcohol use of about 15.0 standard drinks of alcohol per week. He reports current drug use. Frequency: 1.00 time per week. Drugs: Other and Marijuana.    Family History:  family history includes Arthritis in his mother; Atrial fibrillation in his mother; Blood Disorder in his father; Leukemia in his father; Stroke in his mother.      Allergies:  No Known Allergies    Outpatient Medications:  Current Outpatient Medications   Medication Instructions    cholecalciferol (VITAMIN D3) 2,000 Units, Daily    DIETARY SUPPLEMENT ORAL Take by mouth. PROBIOTICS WHEN TRAVELING    multivitamin with minerals iron-free (Centrum Silver) 1 tablet, Daily    tamsulosin (FLOMAX) 0.4 mg, oral, Daily    traZODone (Desyrel) 50 mg tablet TAKE 1/2 A TABLET (25 MG) BY MOUTH ONCE DAILY AT BEDTIME.       Physical Exam:  GENERAL: alert, cooperative, pleasant, in no acute distress  SKIN: warm, dry, no rash.  NECK: no JVD, no JOSH  CARDIAC:  Regular rate and rhythm with no rubs, murmurs, or gallops  CHEST: Normal respiratory efforts, lungs clear to auscultation bilaterally.  ABDOMEN: soft, nontender, nondistended  EXTREMITIES: no edema  NEURO: Alert and oriented x 3.  Grossly normal.  Moves all 4 extremities.      Vitals:    04/08/25 0942   BP: 125/80   BP Location: Left arm   Pulse: 63   SpO2: 99%   Weight: 97.5 kg (215 lb)       Wt Readings from Last 5 Encounters:   12/12/24 107 kg (235 lb 3.7 oz)   11/21/24 106 kg (233 lb 6.4 oz)   11/08/24 103 kg (227 lb 1.2 oz)   10/25/24 100 kg (220 lb 7.4 oz)   10/10/24 103 kg (227 lb)     Body mass index is 25.49 kg/m².        Last Labs:  CMP:  Recent Labs     09/19/24  0413 09/18/24  1526 04/29/21  0807    139 141   K 3.8 4.0 4.8    107 107   CO2 24 22 27   ANIONGAP 11 14 12   BUN 17 17 18   CREATININE 1.01 0.97 1.10   EGFR 85 89  --    GLUCOSE 103* 100* 88     Recent Labs     09/19/24  0413 09/18/24  1526 07/25/18  1610   ALBUMIN 3.5 4.3 4.5   ALKPHOS 42 53 45   ALT 15 19 18   AST 15 17 16   BILITOT 0.7 0.6 0.6     CBC:  Recent Labs     09/19/24 0413 09/18/24  1526 07/25/18  1610   WBC 5.1 5.9 6.2   HGB 14.2 15.0 15.0   HCT 40.5* 44.0 42.1    192 203   MCV 90 91 89     COAG:   Recent Labs     09/18/24  1526   DDIMERVTE 431     ENDO:  Recent Labs     09/19/24  0413 09/18/24  1526 04/29/21  0807   TSH  --  1.26  --    HGBA1C 5.1  --  4.6      CARDIAC:   Recent Labs     09/18/24  1657 09/18/24  1526   TROPHS 4 3     Recent Labs     09/19/24  0413 04/29/21  0807   CHOL 185 189   LDLF  --  114*   LDLCALC 119*  --    HDL 46.4 49.1   TRIG 98 130         Assessment/Plan   59 y.o. male with h/o GERD, hernia repair, BPH, afib s/p LAKESHA/DCC 9/19/24    Single episode of A-fib with RVR 9/2024 with symptomatic lightheadedness and dizziness and shortness of breath.  Possibly triggered by hernia surgery and/or anesthesia.  Tolerated recent prostate surgery without incident.  Low [id number] of 0 off eliquis  after completing 30 days post cardioversion. Off metoprolol as well due to severe bradycardia previously.  He does have some as needed flecainide at home and advised if he does get an episode to take metoprolol first then flecainide.  Continue moderate exercise.  Reviewed moderate alcohol consumption at most.  Plan to follow-up in about 1 year.            Followup Appts:  Future Appointments   Date Time Provider Department Center   4/8/2025  9:40 AM Gomez Smith DO [id number] [facility]   4/15/2025 10:00 AM Lilliam Stallings MD [id number] [facility]   5/6/2025  3:00 PM Jennifer Saunders DPM [id number] [facility]           ____________________________________________________________  Gomez Smith DO  [facility]  [facility]

## 2025-04-08 ENCOUNTER — OFFICE VISIT (OUTPATIENT)
Dept: CARDIOLOGY | Facility: CLINIC | Age: 60
End: 2025-04-08
Payer: COMMERCIAL

## 2025-04-08 VITALS
OXYGEN SATURATION: 99 % | HEART RATE: 63 BPM | WEIGHT: 215 LBS | SYSTOLIC BLOOD PRESSURE: 125 MMHG | DIASTOLIC BLOOD PRESSURE: 80 MMHG | BODY MASS INDEX: 25.49 KG/M2

## 2025-04-08 DIAGNOSIS — I48.0 PAROXYSMAL ATRIAL FIBRILLATION (MULTI): Primary | ICD-10-CM

## 2025-04-08 PROCEDURE — 99213 OFFICE O/P EST LOW 20 MIN: CPT | Performed by: INTERNAL MEDICINE

## 2025-04-08 RX ORDER — MELOXICAM 15 MG/1
1 TABLET ORAL
COMMUNITY
Start: 2025-04-01

## 2025-04-08 RX ORDER — ALLOPURINOL 100 MG/1
1 TABLET ORAL
COMMUNITY
Start: 2025-04-01

## 2025-04-15 ENCOUNTER — APPOINTMENT (OUTPATIENT)
Dept: PRIMARY CARE | Facility: CLINIC | Age: 60
End: 2025-04-15
Payer: COMMERCIAL

## 2025-04-15 VITALS — HEIGHT: 77 IN | WEIGHT: 228.8 LBS | BODY MASS INDEX: 27.01 KG/M2 | TEMPERATURE: 98.3 F

## 2025-04-15 DIAGNOSIS — N39.3 STRESS INCONTINENCE, MALE: ICD-10-CM

## 2025-04-15 DIAGNOSIS — I48.0 PAROXYSMAL ATRIAL FIBRILLATION (MULTI): ICD-10-CM

## 2025-04-15 DIAGNOSIS — B35.1 ONYCHOMYCOSIS: Primary | ICD-10-CM

## 2025-04-15 DIAGNOSIS — Z00.00 ANNUAL PHYSICAL EXAM: ICD-10-CM

## 2025-04-15 DIAGNOSIS — C61 MALIGNANT NEOPLASM OF PROSTATE (MULTI): ICD-10-CM

## 2025-04-15 PROCEDURE — 99396 PREV VISIT EST AGE 40-64: CPT | Performed by: INTERNAL MEDICINE

## 2025-04-15 PROCEDURE — 3008F BODY MASS INDEX DOCD: CPT | Performed by: INTERNAL MEDICINE

## 2025-04-15 ASSESSMENT — PATIENT HEALTH QUESTIONNAIRE - PHQ9
2. FEELING DOWN, DEPRESSED OR HOPELESS: NOT AT ALL
SUM OF ALL RESPONSES TO PHQ9 QUESTIONS 1 AND 2: 0
1. LITTLE INTEREST OR PLEASURE IN DOING THINGS: NOT AT ALL

## 2025-04-15 NOTE — PROGRESS NOTES
Steven Brice is a 60 y.o. male   Patient with a past medical history of GERD, Esophagitis, Hearing loss, Osteoarthritis, Kidney stones/ Prostate CA s/p prostatectomy, Lumbar stenosis, Constipation, Tubular Adenoma (2029) history of atrial fibrillation RVR s/p cardioversion (off anticoagulation)    Patient underwent a successful da Stepan robotic prostatectomy  Will follow-up with urology and oncology as an outpatient  Repeat PSA pending  PET scan etc. Pending      Does complain of right-sided hip pain that radiates down the thigh  Worse in the morning  Has been trying on new mattresses  Previous imaging does confirm lumbar stenosis and bilateral osteoarthritis of the hips      No chest pain/  SOB/ dizziness  BM OK  Energy level ok  Appetite OK             Review of Systems     Constitutional: not feeling poorly, no fever, no recent weight gain and no recent weight loss.   Eyes: no blurred vision and no diplopia.   ENT: no hearing loss, no tinnitus, no earache, no sore throat, no hoarseness and no swollen glands in the neck.   Cardiovascular: no chest pain, no tightness or heavy pressure, no shortness of breath, no palpitations and no lower extremity edema.   Respiratory: no cough, wheezing or shortness of breath at rest or exertion  Gastrointestinal: no change in bowel habits, no diarrhea, no constipation, no bloody stools, no nausea, no vomiting, no abdominal pain, no signs and symptoms of ulcer disease, no leah colored stools and no intolerance to fatty foods.   Genitourinary: Stress incontinence.  Musculoskeletal: no arthralgias, no joint stiffness, no muscle weakness, and no difficulty walking.   Skin: no rashes, no change in skin color and pigmentation, no skin lesions and no skin lumps.   Neurological: no headaches, no dizziness, no seizures, no tingling, no numbness, no signs and symptoms of stroke and no limb weakness.   Psychiatric: no confusion, no memory lapses or loss, no depression and no sleep  disturbances.   Endocrine: no goiter, no thyroid disorder, no diabetes mellitus, no excessive thirst, no dry skin, no cold intolerance, no heat intolerance and no increased urinary frequency.   Hematologic/Lymphatic: is not slow to heal, does not bleed easily, does not bruise easily, no thrombophlebitis, no anemia and no history of blood transfusion.   All other systems have been reviewed and are negative for complaint.     Current Outpatient Medications   Medication Instructions    allopurinol (Zyloprim) 100 mg tablet 1 tablet, Daily (0630)    cholecalciferol (VITAMIN D3) 2,000 Units, Daily    DIETARY SUPPLEMENT ORAL Take by mouth. PROBIOTICS WHEN TRAVELING    meloxicam (Mobic) 15 mg tablet 1 tablet, Daily (0630)    multivitamin with minerals iron-free (Centrum Silver) 1 tablet, Daily    tamsulosin (FLOMAX) 0.4 mg, oral, Daily    traZODone (Desyrel) 50 mg tablet TAKE 1/2 A TABLET (25 MG) BY MOUTH ONCE DAILY AT BEDTIME.       There were no vitals filed for this visit.     Physical Exam      Constitutional   General appearance: Alert and in no acute distress.   Eyes   Inspection of eyes: Sclera and conjunctiva were normal.    Pupil exam: Pupils were equal in size. Extraocular movements were intact.   Ears, Nose, Mouth, and Throat   Ears: Auricles: Normal.    Otoscopic examination: Tympanic membranes: Normal with no congestion and no discharge. Otic Canals: Normal without tenderness, congestion or discharge.    Oropharynx: Normal with moist mucus membranes, no congestion. Tonsils: Normal no follicles.    Hearing: Normal.     Nasal mucosa, septum, and turbinates: Normal without edema or erythema.    Lips, teeth, and gums: Normal.   Neck   Neck Exam: Appearance of the neck was normal. No neck masses observed.    Thyroid exam: Not enlarged and no palpable thyroid nodules.   Pulmonary   Respiratory assessment: No respiratory distress, normal respiratory rhythm and effort.    Auscultation of Lungs: Clear bilateral breath  sounds.   Cardiovascular   Auscultation of heart: Apical pulse normal, heart rate and rhythm normal, normal S1 and S2, no murmurs and no pericardial rub.    Carotid arteries: Pulses normal with no bruits.    Femoral pulses: Normal without bruits.    Exam for edema: No peripheral edema.    Abdominal aorta: Normal.     Pedal pulses: 2+ bilaterally.    Peripheral vascular exam: Normal.    Chest   Breast inspection: Normal appearance.    Breast palpation: No palpable masses.    Chest: Normal A_P diameter, no pulsation, no intercostal withdrawing. Trachea central.   Abdomen   Abdominal Exam: No bruits, normal bowel sounds, soft, non-tender, no abdominal mass palpated.    Liver and Spleen exam: No hepato-splenomegaly.       Examination for hernias: Normal.    Genitourinary   deferred  Lymphatic   Palpation of lymph nodes in neck: No cervical lymphadenopathy.   Musculoskeletal   Examination of gait: Normal.    Inspection of digits and nails: No clubbing or cyanosis of the fingernails.    Inspection/palpation of joints, bones and muscles: No joint swelling. Normal movement of all extremities.    Range of Motion: Normal movement of all extremities.    Assessment of Stability: Normal.    Muscle strength/tone: Normal.    Skin   Skin inspection: Normal skin color and pigmentation, normal skin turgor and no visible rash.    Examination of the skin for lesions: Normal.    Neurologic   Cranial nerves: Nerves 2-12 were intact, no focal neuro defects.    Cortical function: Normal.     Reflexes: Normal.     Sensation: Normal.     Coordination: Normal.    Psychiatric   Judgment and insight: Intact.    Orientation: Oriented to person, place, and time.    Mood and affect: Normal.    Recent and remote memory: Normal.      Assessment/Plan      There are no diagnoses linked to this encounter.             Patient with a past medical history of GERD, Esophagitis, Hearing loss, Osteoarthritis, Kidney stones/ Prostate CA s/p prostatectomy,  Lumbar stenosis, Constipation, Tubular Adenoma (2029) history of atrial fibrillation RVR s/p cardioversion     #Stress incontinence post prostatectomy  Patient started doing Kegel exercises  Continue daily practice      #Lumbar stenosis with right-sided sciatica  Recommend core strengthening exercises stretching yoga  Patient is working on getting a new mattress  Anti-inflammatories as needed for pain    #Onychomycosis of bilateral toes  Try over-the-counter Vicks VapoRub on the nails  If that does not help we can consider Jublia    History of paroxysmal atrial fibrillation status post cardioversion  Off of all anticoagulation  Currently normal sinus rhythm    # You should increase your intake of fresh fruits and vegetables. Try to consume at least 4 servings of such foods everyday.  # Try to add Fish to your diet , or start daily Omega 3 supplements  # You should increase your intake of unprocessed nuts like almonds and walnuts.  # Increase your plant based proteins.  # Use [employer].  # You should avoid fried foods and processed meats, and minimize eating in restaurants.  # Do not consume sugary or starchy foods and drinks. If the choice is between fresh fruit and juice, always choose fresh fruits.  # Limit alcoholic beverages to no more than one a day  # Try to avoid adding any salt to your food, limiting it to 2000 mg / day Use pepper and seasonings instead.  # Try to exercise 6 times a week, for a total of 150 minutes a week. Walking is a great exercise, but add some strength training too.    As your Primary Care doctor, I will be available to you at any time to answer any questions you have concerning your health.  Live [place] and [place]!

## 2025-04-21 DIAGNOSIS — C61 MALIGNANT NEOPLASM OF PROSTATE (MULTI): Primary | ICD-10-CM

## 2025-04-21 DIAGNOSIS — Z00.00 ANNUAL PHYSICAL EXAM: Primary | ICD-10-CM

## 2025-04-26 LAB
ALBUMIN SERPL-MCNC: 4.3 G/DL (ref 3.6–5.1)
ALP SERPL-CCNC: 41 U/L (ref 35–144)
ALT SERPL-CCNC: 13 U/L (ref 9–46)
ANION GAP SERPL CALCULATED.4IONS-SCNC: 7 MMOL/L (CALC) (ref 7–17)
AST SERPL-CCNC: 14 U/L (ref 10–35)
BILIRUB SERPL-MCNC: 0.8 MG/DL (ref 0.2–1.2)
BUN SERPL-MCNC: 15 MG/DL (ref 7–25)
CALCIUM SERPL-MCNC: 9.2 MG/DL (ref 8.6–10.3)
CHLORIDE SERPL-SCNC: 107 MMOL/L (ref 98–110)
CHOLEST SERPL-MCNC: 182 MG/DL
CHOLEST/HDLC SERPL: 3.6 (CALC)
CO2 SERPL-SCNC: 26 MMOL/L (ref 20–32)
CREAT SERPL-MCNC: 1.11 MG/DL (ref 0.7–1.35)
EGFRCR SERPLBLD CKD-EPI 2021: 75 ML/MIN/1.73M2
ERYTHROCYTE [DISTWIDTH] IN BLOOD BY AUTOMATED COUNT: 12.5 % (ref 11–15)
EST. AVERAGE GLUCOSE BLD GHB EST-MCNC: 100 MG/DL
EST. AVERAGE GLUCOSE BLD GHB EST-SCNC: 5.5 MMOL/L
GLUCOSE SERPL-MCNC: 91 MG/DL (ref 65–99)
HBA1C MFR BLD: 5.1 %
HCT VFR BLD AUTO: 43.9 % (ref 38.5–50)
HDLC SERPL-MCNC: 50 MG/DL
HGB BLD-MCNC: 14.4 G/DL (ref 13.2–17.1)
LDLC SERPL CALC-MCNC: 113 MG/DL (CALC)
MCH RBC QN AUTO: 31.6 PG (ref 27–33)
MCHC RBC AUTO-ENTMCNC: 32.8 G/DL (ref 32–36)
MCV RBC AUTO: 96.5 FL (ref 80–100)
NONHDLC SERPL-MCNC: 132 MG/DL (CALC)
PLATELET # BLD AUTO: 182 THOUSAND/UL (ref 140–400)
PMV BLD REES-ECKER: 9.2 FL (ref 7.5–12.5)
POTASSIUM SERPL-SCNC: 5.1 MMOL/L (ref 3.5–5.3)
PROT SERPL-MCNC: 6.4 G/DL (ref 6.1–8.1)
PSA SERPL-MCNC: 0.04 NG/ML
RBC # BLD AUTO: 4.55 MILLION/UL (ref 4.2–5.8)
SODIUM SERPL-SCNC: 140 MMOL/L (ref 135–146)
TRIGL SERPL-MCNC: 90 MG/DL
TSH SERPL-ACNC: 2.37 MIU/L (ref 0.4–4.5)
WBC # BLD AUTO: 4.7 THOUSAND/UL (ref 3.8–10.8)

## 2025-05-06 ENCOUNTER — APPOINTMENT (OUTPATIENT)
Dept: PODIATRY | Facility: CLINIC | Age: 60
End: 2025-05-06
Payer: COMMERCIAL

## 2025-08-05 NOTE — PROGRESS NOTES
Delilah Cabrera MD   Adult Reconstruction and Joint Replacement Surgery  Phone: [phone]     Fax: [phone]       Name: Steven Brice  Age: 60 y.o.   : 1965   Date of Visit: 2025    INITIAL CONSULTATION    CC: RIGHT hip pain    HPI:  This patient presents with 8 months of RIGHT hip pain.     Patient has tried the following Activity modification, NSAIDs, Tylenol (arthritis dosing) , and Xray. Date of last steroid injection: never. Patient does have pain at night. Patient does not report falls related to this problem. Patient is able to walk unlimited blocks. Patient is currently using nothing as assistive device. Primarily complains of lateral hip pain. Patient has difficulty with stairs. The pain is significantly impacting their ability to perform activities of daily living. Patient reports no longer able to do activities such as basketball, running.     Focused History  *Directly transcribed from patient self-reported intake sheet and [facility] Medical Records.      PMH: Reviewed, Heart Disease / Heart Attack, and PE/DVT: no. Afib not on AC, had prior ablation.   PSH: Reviewed , Hip/Knee replacement: no, Hip/Knee surgery: left knee arthroscopy, ***, Anesthesia complications: no, Spine surgery: no, Surgical infection: no, and Weight loss surgery: no  SHx: Reviewed, Occupation: sales, Current smoker: no, EtOH intake weekly: 8 drinks, Social support: not reported, and Preferred physical activities: sports, elliptical  Jehovah´s Witness: no  Meds: Reviewed, Current Anticoagulants: no, Weight loss medication: no, and Current Opioids: no  Allergies: Reviewed  and The patient reports no contraindications or allergies to cephalosporins, aspirin, NSAIDs or opioids, except as noted above.  Dental Hx: Last routine cleaning: 3 months and All invasive dental work must be completed 3+ months prior to joint replacement surgery. Dental cleaning must be completed 6+ weeks prior to joint replacement surgery.  Patient are to avoid any invasive dental work 3-6 months post-surgically.   FH: No family history of any bleeding or clotting disorders.    PROMS/HISTORY  PROMs   Koos Jr-Knee Injury And Osteoarthritis Outcome Score For Joint Replacement    6/24/2025  2:40 PM EDT - Filed by Patient   Instructions    The following question concerns the amount of joint stiffness you have experienced during the last week in your knee. Stiffness is a sensation of restriction or slowness in the ease with which you move your knee joint.   How severe is your knee stiffness after first wakening in the morning? Mild   What amount of knee pain have you experienced the last week during the following activities?   Twisting/pivoting on your knee Mild   Straightening knee fully None   Going up or down stairs Mild   Standing upright None   The following questions concern your physical function. By this we mean your ability to move around and to look after yourself. For each of the following activities please indicate the degree of difficulty you have experienced in the last week due to your knee.   Rising from sitting Mild   Bending to floor/ an object None   Koos Jr Scoring (range: 0 - 100) 76.33     Hoos Jr-Hip Disability And Osteoarthritis Outcome Score For Joint Replacement    6/24/2025  2:39 PM EDT - Filed by Patient   Instructions    What amount of hip pain have you experienced the last week during the following activities?   Going up or down stairs Moderate   Walking on an uneven surface Mild   The following questions concern your physical function. By this we mean your ability to move around and to look after yourself. For each of the following activities please indicate the degree of difficulty you have experienced in the last week due to your hip.   Rising from sitting Mild   Bending to floor/ an object None   Lying in bed (turning over, maintaining hip position) Mild   Sitting None   Hoos Jr Scoring (range: 0 - 100) 73.47           Medical History[1]    Medical History[2]  Documented in chart and reviewed.     Surgical History[3]    Allergies: He is allergic to metoprolol.     Medications:  Current Outpatient Medications   Medication Instructions    allopurinol (Zyloprim) 100 mg tablet 1 tablet, Daily (0630)    cholecalciferol (VITAMIN D3) 2,000 Units, Daily    DIETARY SUPPLEMENT ORAL Take by mouth. PROBIOTICS WHEN TRAVELING    meloxicam (Mobic) 15 mg tablet 1 tablet, Daily (0630)    multivitamin with minerals iron-free (Centrum Silver) 1 tablet, Daily    tamsulosin (FLOMAX) 0.4 mg, oral, Daily    traZODone (Desyrel) 50 mg tablet TAKE 1/2 A TABLET (25 MG) BY MOUTH ONCE DAILY AT BEDTIME.       Family History[4]  Documented in chart and reviewed.     Social History     Tobacco Use    Smoking status: Never     Passive exposure: Never    Smokeless tobacco: Never   Substance Use Topics    Alcohol use: Yes     Alcohol/week: 15.0 standard drinks of alcohol     Types: 5 Glasses of wine, 5 Cans of beer, 5 Shots of liquor per week     Comment: states 15-20 drinks a week per pt        Review of Systems: Review of systems completed with medical assistant intake. Please refer to this note.     Physical Exam:  BMI: 27.    General: The patient is well appearing and has an appropriate affect.     Neurological Examination: SILT in SPN/DPN/Sural/Saphenous/Tibial nerves. 5/5 EHL, FHL, Tibial anterior, Gastrocnemius. Coordination grossly intact.     Cardiovascular Exam: Capillary refill <2 seconds.     Lymphatic Examination: There is no obvious lymphatic swelling present around the involved joint.    Skin Exam: Skin around the pertinent joint is without evidence of infection or rash.    Gait: The patient ambulates with a coxalgic gait.     Lumbar spine:    No tenderness to palpation midline.    Negative straight leg raise bilaterally.    Right Hip Examination:  Gait: Coxalgic gait.    ***Examination of the hip reveals the skin to be intact.    There is  "mild tenderness over the greater trochanter.    There is no obvious swelling.    There is a {POSITIVE/NEGATIVE:83165} Stinchfield test.    Range of motion is: full extension to *** degrees of flexion.    The hip internally rotates to *** degrees and externally rotates to *** degrees.    Abduction is *** degrees and adduction is *** degrees.    There is groin and buttock pain with hip motion.    There is a {POSITIVE/NEGATIVE:94786} straight leg raise.    Abductor strength ***4/5.    ***Left Hip Examination:  ***Examination of the hip reveals the skin to be intact.    There is no tenderness over the greater trochanter.    There is no obvious swelling.    There is a {POSITIVE/NEGATIVE:36779} Stinchfield test.    Range of motion is full extension to *** degrees of flexion.    The hip internally rotates to 20 and externally rotates 40 degrees.    Abduction is 50 degrees and adduction is 20 degrees.    There is no groin and buttock pain with hip motion.    There is a {POSITIVE/NEGATIVE:63952} straight leg raise.    Abductor strength ***4/5.    Right Knee Examination:  ***Examination of the right knee reveals the skin to be intact. There is no obvious swelling.    There is no tenderness to palpation.    Range of motion is full extension to 120 degrees of flexion.    The knee is stable.    There is no grinding with range of motion.    There is no patellofemoral crepitus.    Left Knee Examination:  ***Examination of the left knee reveals the skin to be intact. There is no obvious swelling.    There is no tenderness to palpation.    Range of motion is full extension to 120 degrees of flexion.    The knee is stable.    There is no grinding with range of motion.    There is no patellofemoral crepitus.    Prior Labs:   Prior Labs:   Lab Results   Component Value Date    WBC 4.7 04/25/2025    HGB 14.4 04/25/2025    HCT 43.9 04/25/2025    MCV 96.5 04/25/2025     04/25/2025      No results found for: \"INR\", \"PROTIME\"    " "  Lab Results   Component Value Date    GLUCOSE 91 04/25/2025    CALCIUM 9.2 04/25/2025     04/25/2025    K 5.1 04/25/2025    CO2 26 04/25/2025     04/25/2025    BUN 15 04/25/2025    CREATININE 1.11 04/25/2025      No results found for: \"CKTOTAL\", \"CKMB\", \"CKMBINDEX\", \"TROPONINI\"   Lab Results   Component Value Date    HGBA1C 5.1 04/25/2025         No results found for: \"CRP\"   No results found for: \"SEDRATE\"      Radiographs:  Radiographs were personally reviewed today with the patient. There is evidence of early severe RIGHT  hip osteoarthritis with near bone on bone apposition.    Impression:  This patient presents with early severe RIGHT  hip osteoarthritis with near bone on bone apposition. ***Patient has tried and failed appropriate conservative measures and now has limitation in ADL's. ***The patient is not a candidate for surgery at this time.    Diagnosis:   ***    Recommendations / Plan:    I have discussed the options in detail with the patient. We have discussed anti-inflammatory medication, activity modification, physical therapy, corticosteroid injections, and total hip replacement surgery. ***The patient has not yet exhausted all conservative treatment measures.    The risks and benefits of all these treatment options have been discussed in detail. The patient has tried at least 3 months of the above conservative treatments and continues to have disabling pain, impaired activities of daily living and worsened quality of life.*** The patient is a candidate for {LEFT/RIGHT:24342} AMEENA total hip replacement. We discussed anterior and posterolateral surgical approaches to the hip joint with my rationale for *** approach. Risks and benefits of all approaches were reviewed. We discussed expected rehabilitation, DVT prophylaxis, time points during recovery, likely functional outcomes and long-term issues with hip replacement procedures.    We have reviewed the typical recovery after surgery and " have discussed the fact that full recovery can take up to 1 year. ***For anterior approach surgery, I specifically discussed the increased risk for intra-operative fracture, the risk of aseptic femoral failure, and the risk for lateral femoral cutaneous nerve injury.    ***The patient has pre-existing hardware around the joint. Discussed that some or all of these implants may need to be removed to accommodate a joint replacement.    The patient does not have any of the following contraindications to arthroplasty including active infection of the hip joint, systemic bacteremia, active skin infection or an open wound at the surgical site, neuropathic arthritis or severe, rapidly progressive neurological disease.     Patient will consider proceeding with {LEFT/RIGHT:01929} AMEENA. All questions were answered today. If the patient chooses to move forward with surgical scheduling, they will be enrolled in a preoperative arthroplasty teaching class and the pre-admission testing pathway. The patient was encouraged to contact their primary care physician*** to discuss fitness for surgery.     Social support after surgery: {Social:61981}  Pain medication plan: {Pain Plan:34591}  Additional preoperative considerations: {Preop:13697}    Diagnosis: {Surg DX:04859}   Procedure: {Surg Procedure:60973}  Surgery Location: {Surg Location:67940}   Equipment Requests: {Equipment Requests Primary:84600}  Anesthesia: {Anes:17304}  Discharge: {DISCHARGE:10470}    ***The risks and benefits of all these treatment options have been discussed in detail.     ***The patient has tried at least 3 months of the above conservative treatments and continues to have disabling pain, impaired activities of daily living and worsened quality of life.  Reviewed the surgical optimization steps to optimize their chances for a successful joint replacement surgery.      ***Currently their BMI is ***.  Discussed that obesity is a risk factor for continued  progression of osteoarthritis. Each pound of weight loss offloads their hip and knee joints by 3-6 pounds.  The most effective of these options is weight loss mainly through restricting caloric intake. ***They would need to lose significant weight before being scheduled for surgery. A referral to a nutritionist was offered***. A referral to bariatric surgery was offered***.     ***The patient was given a referral to medical spine for further evaluation.    ***A physical therapy prescription was ordered for the patient.  ***Patient will continue their home exercise program. ***Strategies for pain management using over-the-counter anti-inflammatory medications reviewed.  ***The patient was prescribed ***for pain management. ***Discussed the potential benefit of a steroid injection and the patient was referred to my nonsurgical sports partners for consideration. Encouraged them to maintain range of motion and strength around the hip and knee joints.  They will continue to implement these strategies in addressing their pain.      ***Patient was advised to seek further refills for prescription anti-inflammatory medications (e.g. Celebrex, Meloxicam) from their primary care physician as careful monitoring of kidney function, heart function, blood pressure, and other health considerations is important while on these medications.      ***Recommend the patient continue optimizing nonsurgical treatment interventions as outlined above for management of their arthritis.  I would be happy to see them again at any point to discuss surgery if indicated or they are more optimized or to review progress with nonsurgical treatment of arthritis.  The patient verbalizes understanding with the recommendations and treatment plan as outlined above and is in agreement.  Questions were addressed.    _____________  Delilah Cabrera MD   Attending Orthopaedic Surgeon  [facility] [place]    [facility]  [place]       This office note was transcribed with dictation software.  Please excuse any typographical errors, program misunderstandings leading to inadvertent insertions or deletions of inappropriate wording, pronoun errors and other unintentional transcription errors not noticed on proof-reading.                                  [1]   Past Medical History:  Diagnosis Date    Arthritis 2020    Atrial fibrillation (Multi)     s/p cardioversion 9/19/24 - continued medication    BPH (benign prostatic hyperplasia)     GERD (gastroesophageal reflux disease) 2020    under control    Gout     HL (hearing loss) Years ago    left ear    Nephrolithiasis     OA (osteoarthritis)     Prostate cancer (Multi)     Spinal stenosis     lumbar    Vertigo     mild symptoms   [2]   Past Medical History:  Diagnosis Date    Arthritis 2020    Atrial fibrillation (Multi)     s/p cardioversion 9/19/24 - continued medication    BPH (benign prostatic hyperplasia)     GERD (gastroesophageal reflux disease) 2020    under control    Gout     HL (hearing loss) Years ago    left ear    Nephrolithiasis     OA (osteoarthritis)     Prostate cancer (Multi)     Spinal stenosis     lumbar    Vertigo     mild symptoms   [3]   Past Surgical History:  Procedure Laterality Date    CARDIOVERSION  09/19/2024    COLONOSCOPY      HEMORRHOID SURGERY  06/27/2014    HERNIA REPAIR  2010    x3 -- 1987 and 2010 and 8/28/2024    KNEE ARTHROSCOPY W/ DEBRIDEMENT Left    [4]   Family History  Problem Relation Name Age of Onset    Stroke Mother Nelia Mullinsens     Arthritis Mother Neliajv Mullinsens     Atrial fibrillation Mother Nelia Mullinsens     Leukemia Father Steven Brice     Blood Disorder Father Steven Brice

## 2025-08-06 ENCOUNTER — HOSPITAL ENCOUNTER (OUTPATIENT)
Dept: RADIOLOGY | Facility: HOSPITAL | Age: 60
Discharge: HOME | End: 2025-08-06
Payer: COMMERCIAL

## 2025-08-06 ENCOUNTER — OFFICE VISIT (OUTPATIENT)
Dept: ORTHOPEDIC SURGERY | Facility: HOSPITAL | Age: 60
End: 2025-08-06
Payer: COMMERCIAL

## 2025-08-06 VITALS — BODY MASS INDEX: 26.92 KG/M2 | WEIGHT: 228 LBS | HEIGHT: 77 IN

## 2025-08-06 DIAGNOSIS — M19.90 OSTEOARTHRITIS, UNSPECIFIED OSTEOARTHRITIS TYPE, UNSPECIFIED SITE: ICD-10-CM

## 2025-08-06 DIAGNOSIS — M16.11 PRIMARY OSTEOARTHRITIS OF RIGHT HIP: Primary | ICD-10-CM

## 2025-08-06 PROCEDURE — 73502 X-RAY EXAM HIP UNI 2-3 VIEWS: CPT | Mod: RIGHT SIDE | Performed by: RADIOLOGY

## 2025-08-06 PROCEDURE — 73502 X-RAY EXAM HIP UNI 2-3 VIEWS: CPT | Mod: RT

## 2025-08-06 PROCEDURE — 99202 OFFICE O/P NEW SF 15 MIN: CPT | Performed by: STUDENT IN AN ORGANIZED HEALTH CARE EDUCATION/TRAINING PROGRAM

## 2025-08-06 ASSESSMENT — PAIN - FUNCTIONAL ASSESSMENT: PAIN_FUNCTIONAL_ASSESSMENT: 0-10

## 2025-08-06 ASSESSMENT — PAIN SCALES - GENERAL: PAINLEVEL_OUTOF10: 1

## 2025-08-08 ENCOUNTER — APPOINTMENT (OUTPATIENT)
Dept: PAIN MEDICINE | Facility: HOSPITAL | Age: 60
End: 2025-08-08
Payer: COMMERCIAL

## 2025-08-13 ENCOUNTER — APPOINTMENT (OUTPATIENT)
Dept: PAIN MEDICINE | Facility: HOSPITAL | Age: 60
End: 2025-08-13
Payer: COMMERCIAL

## 2025-08-13 DIAGNOSIS — M16.11 ARTHRITIS OF RIGHT HIP: Primary | ICD-10-CM

## 2025-08-13 DIAGNOSIS — C61 MALIGNANT NEOPLASM OF PROSTATE (MULTI): ICD-10-CM

## 2025-08-13 PROCEDURE — 99203 OFFICE O/P NEW LOW 30 MIN: CPT | Performed by: PAIN MEDICINE

## 2025-08-13 ASSESSMENT — PAIN SCALES - GENERAL: PAINLEVEL_OUTOF10: 7

## 2025-08-26 ENCOUNTER — HOSPITAL ENCOUNTER (OUTPATIENT)
Facility: HOSPITAL | Age: 60
Discharge: HOME | End: 2025-08-26
Payer: COMMERCIAL

## 2025-08-26 VITALS
DIASTOLIC BLOOD PRESSURE: 81 MMHG | OXYGEN SATURATION: 100 % | TEMPERATURE: 98.1 F | HEART RATE: 46 BPM | RESPIRATION RATE: 16 BRPM | SYSTOLIC BLOOD PRESSURE: 143 MMHG

## 2025-08-26 DIAGNOSIS — M16.11 ARTHRITIS OF RIGHT HIP: ICD-10-CM

## 2025-08-26 PROCEDURE — 20611 DRAIN/INJ JOINT/BURSA W/US: CPT | Performed by: PAIN MEDICINE

## 2025-08-26 PROCEDURE — 2500000004 HC RX 250 GENERAL PHARMACY W/ HCPCS (ALT 636 FOR OP/ED): Mod: JZ | Performed by: PAIN MEDICINE

## 2025-08-26 PROCEDURE — 7100000010 HC PHASE TWO TIME - EACH INCREMENTAL 1 MINUTE

## 2025-08-26 PROCEDURE — 7100000009 HC PHASE TWO TIME - INITIAL BASE CHARGE

## 2025-08-26 RX ORDER — IBUPROFEN 200 MG
200 TABLET ORAL EVERY 6 HOURS
COMMUNITY
End: 2025-08-26 | Stop reason: HOSPADM

## 2025-08-26 RX ORDER — LIDOCAINE HYDROCHLORIDE 5 MG/ML
INJECTION, SOLUTION INFILTRATION; INTRAVENOUS
Status: COMPLETED | OUTPATIENT
Start: 2025-08-26 | End: 2025-08-26

## 2025-08-26 RX ORDER — METHYLPREDNISOLONE ACETATE 40 MG/ML
INJECTION, SUSPENSION INTRA-ARTICULAR; INTRALESIONAL; INTRAMUSCULAR; SOFT TISSUE
Status: COMPLETED | OUTPATIENT
Start: 2025-08-26 | End: 2025-08-26

## 2025-08-26 RX ORDER — ACETAMINOPHEN 500 MG
TABLET ORAL EVERY 6 HOURS PRN
COMMUNITY
End: 2025-08-26 | Stop reason: HOSPADM

## 2025-08-26 RX ADMIN — METHYLPREDNISOLONE ACETATE 40 MG: 40 INJECTION, SUSPENSION INTRA-ARTICULAR; INTRALESIONAL; INTRAMUSCULAR; SOFT TISSUE at 14:19

## 2025-08-26 RX ADMIN — LIDOCAINE HYDROCHLORIDE 9 ML: 5 INJECTION, SOLUTION INFILTRATION at 14:19

## 2025-08-26 ASSESSMENT — PAIN DESCRIPTION - DESCRIPTORS: DESCRIPTORS: ACHING;SHARP

## 2025-08-26 ASSESSMENT — PAIN SCALES - GENERAL
PAINLEVEL_OUTOF10: 4
PAINLEVEL_OUTOF10: 4
PAINLEVEL_OUTOF10: 0 - NO PAIN

## 2025-08-26 ASSESSMENT — PAIN - FUNCTIONAL ASSESSMENT
PAIN_FUNCTIONAL_ASSESSMENT: WONG-BAKER FACES
PAIN_FUNCTIONAL_ASSESSMENT: 0-10
PAIN_FUNCTIONAL_ASSESSMENT: 0-10

## 2025-08-28 ENCOUNTER — APPOINTMENT (OUTPATIENT)
Facility: HOSPITAL | Age: 60
End: 2025-08-28
Payer: COMMERCIAL

## 2026-04-16 ENCOUNTER — APPOINTMENT (OUTPATIENT)
Dept: PRIMARY CARE | Facility: CLINIC | Age: 61
End: 2026-04-16
Payer: COMMERCIAL

## (undated) DEVICE — GOWN, ASTOUND, XL

## (undated) DEVICE — SCOPE WARMER, LAPAROSCOPE, BAG ONLY, LF

## (undated) DEVICE — NEEDLE, SPINAL, QUINCKE, LUER LOCK, 18 G X 6 IN

## (undated) DEVICE — DRESSING, NON-ADHERENT, TELFA, OUCHLESS, 3 X 8 IN, STERILE

## (undated) DEVICE — CORD, MONOPOLARD, 10FT, DISP

## (undated) DEVICE — ADHESIVE, SKIN, LIQUIBAND EXCEED

## (undated) DEVICE — GLOVE, SURGICAL, PROTEXIS PI BLUE W/NEUTHERA, 8.0, PF, LF

## (undated) DEVICE — TOWEL, SURGICAL, NEURO, O/R, 16 X 26, BLUE, STERILE

## (undated) DEVICE — MARKER, SKIN, DUAL TIP INK W/9 LABEL AND REMOVABLE TIME OUT SLEEVE

## (undated) DEVICE — SYRINGE, 12 CC, LUER LOCK, CONTROL, MONOJECT

## (undated) DEVICE — PROBE COVER, ULTRASOUND 8818

## (undated) DEVICE — TUBE SET, PNEUMOLAR HEATED, SMOKE EVACU, HIGH-FLOW

## (undated) DEVICE — GLOVE, SURGEON, PREMIERPRO PI, MICRO, SZ-8.0, PF, WH

## (undated) DEVICE — Device

## (undated) DEVICE — SOLUTION, INJECTION, USP, SODIUM CHLORIDE 0.9%, .9, NACL, 1000 ML, BAG

## (undated) DEVICE — ACCESS SYSTEM, PRECISIONPOINT, TRANSPERINEAL

## (undated) DEVICE — NEEDLE, BIOPSY, MAX CORE, 18G

## (undated) DEVICE — SUTURE, VICRYL, 0, 27 IN, UR-6, VIOLET

## (undated) DEVICE — ACCESS SYS, KII SHIELDED BLADED, Z-THREAD, 5X100CM

## (undated) DEVICE — DRESSING, GAUZE, 16 PLY, 4 X 4 IN, STERILE

## (undated) DEVICE — PUMP, STRYKERFLOW 2 & HANDPIECE W/10FT. IRRIGATION TUBING

## (undated) DEVICE — SUTURE, MONOCRYL, 4-0, 18 IN, PS2, UNDYED

## (undated) DEVICE — NEEDLE, HYPODERMIC, MONOJECT, 18 G X 1.5 IN

## (undated) DEVICE — SHEAR, W/UNIPOLAR CAUTERY, ENDOSHEAR, 5 MM

## (undated) DEVICE — CONTAINER, SPECIMEN, 120 ML, STERILE

## (undated) DEVICE — TROCAR SYSTEM, BALLOON, KII GELPORT, 12 X 100MM

## (undated) DEVICE — ABSORBATACK, 5MM, SINGLE USE/W 15 ABSORBABLE TACKS